# Patient Record
Sex: FEMALE | URBAN - METROPOLITAN AREA
[De-identification: names, ages, dates, MRNs, and addresses within clinical notes are randomized per-mention and may not be internally consistent; named-entity substitution may affect disease eponyms.]

---

## 2022-09-05 ENCOUNTER — INPATIENT HOSPITAL (OUTPATIENT)
Dept: URBAN - METROPOLITAN AREA HOSPITAL 107 | Facility: HOSPITAL | Age: 32
End: 2022-09-05
Payer: COMMERCIAL

## 2022-09-05 DIAGNOSIS — K92.2 GASTROINTESTINAL HEMORRHAGE, UNSPECIFIED: ICD-10-CM

## 2022-09-05 PROCEDURE — 99222 1ST HOSP IP/OBS MODERATE 55: CPT | Performed by: INTERNAL MEDICINE

## 2024-06-14 NOTE — PROGRESS NOTES
Subjective: Headaches     Patient ID: Charlotte Lynn is a 34 y.o. female.    CHIEF COMPLAINT:  Pseudotumor     History of Present Illness Ms. Lynn is a very pleasant 34-year-old  female who has a history of pseudotumor diagnosed by Dr. Sharma at [facility] years ago.  She presented today with her mother.  She is referred as a new patient by Brii FARLEY for pseudotumor.  The patient states she was diagnosed in 2019 and in 2020 she did not follow-up for any medical appointments.  She states she is now working and is established with insurance.  The patient was notified that the only treatments for pseudotumor are: Weight loss, Diamox, Topamax, lumbar puncture, shunt.  She states she cannot tolerate Topamax or Diamox.  She reports she will not have another lumbar puncture.  She was notified that she can go blind if this is not treated.  She states she has not seen a ophthalmologist for many years.  The patient stated she is interested in a shunt procedure to control the pseudotumor.    I notified the patient that I would refer her to neurosurgery and they would discuss all aspects of the shunting procedure.  I also notified the patient that I would send her to ophthalmology and that she should be followed every year at least.    Complaint:Pseudotumor  Onset: 2019  Aura: floaters  Location:  whole head or top of head, behind  quality: stabbing, throbbing  severity: 6  Duration: constant headache  Frequency: daily  Timing: anytime  Worsening factors: light  Alleviating factors:sleep  associated Signs and symptoms: photophobia,dizziness  Current meds: none  Past medications: has tried sumatriptan, Topamax, did not help.      CT UNDER MEDIA      12-8-2023      The following portions of the patient's history were reviewed and updated as appropriate: allergies, current medications, past family history, past medical history, past social history, past surgical history and problem list.      History  reviewed. No pertinent family history.    History reviewed. No pertinent past medical history.    Social History     Socioeconomic History    Marital status: Single   Tobacco Use    Smoking status: Never    Smokeless tobacco: Never   Vaping Use    Vaping status: Every Day    Substances: Nicotine    Passive vaping exposure: Yes   Substance and Sexual Activity    Alcohol use: Yes     Comment: rare    Drug use: Never    Sexual activity: Defer         Current Outpatient Medications:     metoprolol succinate XL (TOPROL-XL) 50 MG 24 hr tablet, Take 1 tablet by mouth Daily., Disp: , Rfl:     Review of Systems   HENT:  Positive for tinnitus.    Psychiatric/Behavioral:  Positive for confusion and decreased concentration. The patient is nervous/anxious.    All other systems reviewed and are negative.       I have reviewed ROS completed by medical assistant.     Objective:    Neurologic Exam     Mental Status   Oriented to person, place, and time.   Oriented to person.   Oriented to place.   Oriented to time.   Attention: normal. Concentration: normal.   Speech: speech is normal   Level of consciousness: alert  Knowledge: good and consistent with education.   Normal comprehension.     Cranial Nerves     CN II   Visual fields full to confrontation.   Visual acuity: normal  Right visual field deficit: none  Left visual field deficit: none     CN III, IV, VI   Pupils are equal, round, and reactive to light.  Extraocular motions are normal.   Right pupil: Shape: regular. Reactivity: brisk. Consensual response: intact. Accommodation: intact.   Left pupil: Shape: regular. Reactivity: brisk. Consensual response: intact. Accommodation: intact.   CN III: no CN III palsy  CN VI: no CN VI palsy  Nystagmus: none   Diplopia: none  Ophthalmoparesis: none  Upgaze: normal  Downgaze: normal  Conjugate gaze: present    CN V   Facial sensation intact.     CN VII   Facial expression full, symmetric.     CN VIII   CN VIII normal.     CN IX, X   CN  IX normal.   CN X normal.   Palate: symmetric    CN XI   CN XI normal.   Right sternocleidomastoid strength: normal  Left sternocleidomastoid strength: normal  Right trapezius strength: normal  Left trapezius strength: normal    CN XII   CN XII normal.   Tongue: not atrophic  Fasciculations: absent  Tongue deviation: none    Motor Exam   Muscle bulk: normal  Overall muscle tone: normal  Right arm tone: normal  Left arm tone: normal  Right arm pronator drift: absent  Left arm pronator drift: absent  Right leg tone: normal  Left leg tone: normal    Strength   Strength 5/5 throughout.   Right neck flexion: 5/5  Left neck flexion: 5/5  Right neck extension: 5/5  Left neck extension: 5/5  Right deltoid: 5/5  Left deltoid: 5/5  Right biceps: 5/5  Left biceps: 5/5  Right triceps: 5/5  Left triceps: 5/5  Right wrist flexion: 5/5  Left wrist flexion: 5/5  Right wrist extension: 5/5  Left wrist extension: 5/5  Right interossei: 5/5  Left interossei: 5/5  Right iliopsoas: 5/5  Left iliopsoas: 5/5  Right quadriceps: 5/5  Left quadriceps: 5/5  Right hamstrin/5  Left hamstrin/5  Right glutei: 5/5  Left glutei: 5/5  Right anterior tibial: 5/5  Left anterior tibial: 5/5  Right posterior tibial: 5/5  Left posterior tibial: 5/5  Right peroneal: 5/5  Left peroneal: 5/5  Right gastroc: 5/5  Left gastroc: 5/5    Sensory Exam   Light touch normal.   Vibration normal.   Right leg vibration: normal  Left leg vibration: normal    Gait, Coordination, and Reflexes     Gait  Gait: normal    Coordination   Romberg: negative  Finger to nose coordination: normal  Heel to shin coordination: normal  Tandem walking coordination: normal    Tremor   Resting tremor: absent  Intention tremor: absent  Action tremor: absent    Reflexes   Right brachioradialis: 1+  Left brachioradialis: 1+  Right biceps: 1+  Left biceps: 1+  Right triceps: 1+  Left triceps: 1+  Right patellar: 1+  Left patellar: 1+  Right achilles: 1+  Left achilles: 1+  Right  : 2+  Left : 2+  Right plantar: normal  Left plantar: normal  Right Noyola: absent  Left Noyola: absent  Right ankle clonus: absent  Left ankle clonus: absent  Right pendular knee jerk: absent  Left pendular knee jerk: absent      Physical Exam  Vitals reviewed.   Constitutional:       Appearance: Normal appearance. She is well-developed and well-groomed. She is obese.   HENT:      Head: Normocephalic and atraumatic.      Right Ear: Hearing normal.      Left Ear: Hearing normal.      Nose: Nose normal.      Mouth/Throat:      Lips: Pink.      Mouth: Mucous membranes are moist.   Eyes:      General: Lids are normal. No visual field deficit.     Extraocular Movements: Extraocular movements intact and EOM normal.      Right eye: Normal extraocular motion and no nystagmus.      Left eye: Normal extraocular motion and no nystagmus.      Pupils: Pupils are equal, round, and reactive to light.   Cardiovascular:      Rate and Rhythm: Normal rate.      Pulses:           Carotid pulses are 2+ on the right side and 2+ on the left side.     Heart sounds: Normal heart sounds, S1 normal and S2 normal.   Pulmonary:      Effort: Pulmonary effort is normal.      Breath sounds: Normal breath sounds.   Musculoskeletal:         General: Normal range of motion.      Cervical back: Full passive range of motion without pain and normal range of motion.      Comments: 5/5   Skin:     General: Skin is warm and dry.   Neurological:      Mental Status: She is alert and oriented to person, place, and time.      Cranial Nerves: No dysarthria or facial asymmetry.      Sensory: No sensory deficit.      Motor: Motor strength is normal.No weakness, tremor, atrophy, abnormal muscle tone, seizure activity or pronator drift.      Coordination: Romberg sign negative. Coordination normal. Finger-Nose-Finger Test, Heel to Shin Test, Romberg Test and Heel to Shin Test normal. Rapid alternating movements normal.      Gait: Gait is intact. Gait and  tandem walk normal.      Deep Tendon Reflexes: Babinski sign absent on the right side. Babinski sign absent on the left side.      Reflex Scores:       Tricep reflexes are 1+ on the right side and 1+ on the left side.       Bicep reflexes are 1+ on the right side and 1+ on the left side.       Brachioradialis reflexes are 1+ on the right side and 1+ on the left side.       Patellar reflexes are 1+ on the right side and 1+ on the left side.       Achilles reflexes are 1+ on the right side and 1+ on the left side.  Psychiatric:         Attention and Perception: Attention and perception normal.         Mood and Affect: Mood normal.         Speech: Speech normal.         Behavior: Behavior is cooperative.         Thought Content: Thought content normal.     Assessment/Plan:  Discussion: The patient has been previously diagnosed with pseudotumor by Dr. Sharma, [facility] neurologist.  I cannot locate any of his records.  The patient also states she has had trials on Diamox and Topamax and could not tolerate each.  She also states she cannot take tolerate any more lumbar punctures.  She also has not been seen by ophthalmology.  I notified the patient that I would refer her to ophthalmology.  She asked for referral to neurosurgery to discuss possible shunting.  Both the patient and her mother were in agreement with these referrals.    Diagnoses and all orders for this visit:    1. Pseudotumor cerebri (Primary)  -     Ambulatory Referral to Ophthalmology  -     Ambulatory Referral to Neurosurgery        Return if symptoms worsen or fail to improve.    I spent 46 minutes caring for Charlotte on this date of service. This time includes time spent by me in the following activities: preparing for the visit, reviewing tests, obtaining and/or reviewing a separately obtained history, performing a medically appropriate examination and/or evaluation, counseling and educating the patient/family/caregiver, referring and communicating  with other health care professionals, documenting information in the medical record, and independently interpreting results and communicating that information with the patient/family/caregiver.      This document has been electronically signed by Meg VERAS on June 17, 2024 15:33 EDT

## 2024-06-17 ENCOUNTER — OFFICE VISIT (OUTPATIENT)
Dept: NEUROLOGY | Facility: CLINIC | Age: 34
End: 2024-06-17
Payer: MEDICAID

## 2024-06-17 VITALS
DIASTOLIC BLOOD PRESSURE: 79 MMHG | SYSTOLIC BLOOD PRESSURE: 118 MMHG | HEIGHT: 64 IN | HEART RATE: 78 BPM | BODY MASS INDEX: 33.29 KG/M2 | WEIGHT: 195 LBS

## 2024-06-17 DIAGNOSIS — G93.2 PSEUDOTUMOR CEREBRI: Primary | ICD-10-CM

## 2024-06-17 PROCEDURE — 1159F MED LIST DOCD IN RCRD: CPT | Performed by: NURSE PRACTITIONER

## 2024-06-17 PROCEDURE — 99204 OFFICE O/P NEW MOD 45 MIN: CPT | Performed by: NURSE PRACTITIONER

## 2024-06-17 PROCEDURE — 1160F RVW MEDS BY RX/DR IN RCRD: CPT | Performed by: NURSE PRACTITIONER

## 2024-06-17 RX ORDER — METOPROLOL SUCCINATE 50 MG/1
50 TABLET, EXTENDED RELEASE ORAL DAILY
COMMUNITY

## 2024-06-17 RX ORDER — ESCITALOPRAM OXALATE 20 MG/1
20 TABLET ORAL
COMMUNITY
Start: 2024-01-05 | End: 2024-06-17

## 2024-06-19 ENCOUNTER — TELEPHONE (OUTPATIENT)
Dept: NEUROLOGY | Facility: CLINIC | Age: 34
End: 2024-06-19
Payer: MEDICAID

## 2024-06-19 DIAGNOSIS — G93.2 PSEUDOTUMOR CEREBRI: Primary | ICD-10-CM

## 2024-06-20 ENCOUNTER — PATIENT ROUNDING (BHMG ONLY) (OUTPATIENT)
Dept: NEUROLOGY | Facility: CLINIC | Age: 34
End: 2024-06-20
Payer: MEDICAID

## 2024-06-20 NOTE — PROGRESS NOTES
.p  My name is Jennifer Kamara and I am the practice manager for [facility].    I want to officially welcome you to our practice and ask you about your recent visit.     If you could tell me about your recent visit with us. What things went well?    We're always looking for ways to make our patients' experiences even better. Do you have any recommendations on ways we may improve?    Overall were you satisfied with your first visit to our practice?    I appreciate you taking the time to answer a few questions today.    Thank you and have a great day.    Jennifer

## 2024-07-12 ENCOUNTER — HOSPITAL ENCOUNTER (OUTPATIENT)
Dept: MRI IMAGING | Facility: HOSPITAL | Age: 34
Discharge: HOME | End: 2024-07-12
Payer: MEDICAID

## 2024-07-12 DIAGNOSIS — G93.2 PSEUDOTUMOR CEREBRI: ICD-10-CM

## 2024-07-12 PROCEDURE — 25010000002 GADOTERIDOL PER 1 ML: Performed by: NURSE PRACTITIONER

## 2024-07-12 PROCEDURE — 70553 MRI BRAIN STEM W/O & W/DYE: CPT

## 2024-07-12 PROCEDURE — A9579 GAD-BASE MR CONTRAST NOS,1ML: HCPCS | Performed by: NURSE PRACTITIONER

## 2024-07-12 RX ADMIN — GADOTERIDOL 20 ML: 279.3 INJECTION, SOLUTION INTRAVENOUS at 15:44

## 2024-07-15 NOTE — PROGRESS NOTES
Please call the patient regarding her abnormal result.  Notify the patient that the MRI was compared to the CT from December 5, 2023 and the other MRI from 2023 in June.  They did not discuss any pseudotumor appearances, they did say that she had a small mucous retention cyst in both maxillary sinuses.  Tell her that most people in the [facility] have mucous cyst somewhere in her sinuses and that she should follow-up with her PCP.  If she has not seen yet she does need to see ophthalmology.            MRI BRAIN W WO CONTRAST  Date of Exam: 7/12/2024 3:16 PM EDT  Indication: Pseudotumor Cerebri.  Comparison: CT head from December 5, 2023 and MRI brain from June 22, 2023   Technique:  Routine multiplanar/multisequence sequence images of the brain were obtained before and after the uneventful administration of Prohance.  Findings:  No acute infarction, intracranial hemorrhage, or extra-axial collection is identified. The ventricles appear normal in caliber, with no evidence of mass effect or midline shift. The basal cisterns appear patent. No pathological parenchymal enhancement or  mass is identified.  The midline structures appear intact. The globes and orbits appear intact. The intracranial vascular flow-voids appear patent. Prominent perivascular spaces within the right centrum semiovale appear unchanged. There are small mucous retention cysts   within the bilateral maxillary sinuses.   IMPRESSION:  Impression:  1.No acute intracranial process identified.  2.Small mucous retention cysts within bilateral maxillary sinuses.   Electronically Signed: Jesus Jaime MD    7/12/2024 5:53 PM EDT    Workstation ID: [id number]

## 2024-07-30 ENCOUNTER — TELEPHONE (OUTPATIENT)
Dept: NEUROLOGY | Facility: CLINIC | Age: 34
End: 2024-07-30
Payer: MEDICAID

## 2024-07-30 NOTE — TELEPHONE ENCOUNTER
Called and informed patient that [facility] was the closest Neurosurgery office at this time. She voiced understanding.

## 2024-07-30 NOTE — TELEPHONE ENCOUNTER
Provider: ANGIE    Caller: BONY    Phone Number: [phone]     Reason for Call: PT CALLED AND IS WANTING TO KNOW IF THERE IS A NEUROSURGEON THAN SHE CAN SEE IN [place] AS PT DOES NOT HAVE TRANSPORTATION TO [place] AT THIS TIME.    THANK YOU

## 2024-08-01 ENCOUNTER — TELEPHONE (OUTPATIENT)
Dept: NEUROSURGERY | Facility: CLINIC | Age: 34
End: 2024-08-01

## 2024-08-01 NOTE — TELEPHONE ENCOUNTER
PATIENT CALLED IN TO GET TODAY'S APPOINTMENT RESCHEDULED; ATTEMPTED WT AND NOT SUCCESSFUL    PLEASE CALL PATIENT TO RESCHEDULE     [phone]    THANK YOU!

## 2024-08-06 NOTE — PROGRESS NOTES
Subjective   Patient ID: Charlotte Lynn is a 34 y.o. female is being seen for consultation today at the request of Meg Oakes, * for - Pseudotumor cerebri.MRI brain was completed on 07-.    History of Present Illness  34-year-old  female who has a history of pseudotumor diagnosed by Dr. Sharma at [facility] years ago.  She presented today with her mother.  The patient states she was diagnosed in 2019 and in 2020 and she did not follow-up for any medical appointments secondary to insurance issues.  She states she is now working and is established with insurance.  The patient was notified that the only treatments for pseudotumor are: Weight loss, Diamox, Topamax, lumbar puncture, or shunt.  She states she cannot tolerate Topamax or Diamox.  She reports she will not have another lumbar puncture.  She was notified that she can go blind if this is not treated.  She states she has not seen a ophthalmologist for many years, but is scheduled to see one soon.  The patient stated she is interested in a shunt procedure to control the pseudotumor, but presents today to discuss the possibility of a dural sinus stent should she have dural sinus stenosis causing her IIH.    The following portions of the patient's history were reviewed and updated as appropriate: She  has no past medical history on file.  She does not have any pertinent problems on file.  She  has a past surgical history that includes Other surgical history.  Her family history is not on file.  She  reports that she has never smoked. She has never used smokeless tobacco. She reports current alcohol use. She reports that she does not use drugs.  Current Outpatient Medications   Medication Sig Dispense Refill   • metoprolol succinate XL (TOPROL-XL) 50 MG 24 hr tablet Take 1 tablet by mouth Daily.       No current facility-administered medications for this visit.     Current Outpatient Medications on File Prior to Visit   Medication Sig   •  "metoprolol succinate XL (TOPROL-XL) 50 MG 24 hr tablet Take 1 tablet by mouth Daily.     No current facility-administered medications on file prior to visit.     She has No Known Allergies..    Review of Systems   Neurological:  Positive for dizziness. Negative for facial asymmetry, light-headedness, numbness and headaches.   All other systems reviewed and are negative.    Objective     Vitals:    24 1042   BP: 132/80   Pulse: 74   Temp: 97.3 °F (36.3 °C)   SpO2: 100%   Weight: 88.5 kg (195 lb)   Height: 162 cm (63.78\")     Body mass index is 33.7 kg/m².      Physical Exam  Vitals and nursing note reviewed.   Constitutional:       General: She is in acute distress.      Appearance: She is obese.   Eyes:      Extraocular Movements: Extraocular movements intact.   Cardiovascular:      Rate and Rhythm: Normal rate.   Pulmonary:      Effort: Pulmonary effort is normal.   Musculoskeletal:         General: Normal range of motion.      Cervical back: Normal range of motion.   Skin:     General: Skin is warm and dry.   Neurological:      General: No focal deficit present.      Mental Status: She is alert and oriented to person, place, and time.      Cranial Nerves: No cranial nerve deficit.      Sensory: No sensory deficit.      Motor: No weakness.      Coordination: Coordination normal.      Gait: Gait normal.   Neurologic Exam     Mental Status   Oriented to person, place, and time.     Assessment & Plan   Independent Review of Radiographic Studies:      I personally reviewed the images from the following studies.    The MRI of the brain performed on 2024 was reviewed with the patient showing no intracranial abnormality, but identification of some maxillary sinus disease.    Medical Decision Makin-year-old female with history of pseudotumor cerebri with intolerance for Diamox having dizziness, photophobia and tinnitus.  3 lumbar punctures have been performed with elevated opening pressures.  Patient " is getting ready to have an additional LP in the near future.  No imaging of the dural sinuses has been performed and a CT venogram will be ordered to assess for any dural sinus stenosis for the resulting cause of the IIH.  Depending on the results of the CTV, the patient may be a candidate for dural sinus stenting.  Diagnoses and all orders for this visit:    1. Idiopathic intracranial hypertension (Primary)    2. Chronic intractable headache, unspecified headache type      Return in about 1 week (around 8/27/2024) for Recheck CT venogram.

## 2024-08-20 ENCOUNTER — OFFICE VISIT (OUTPATIENT)
Dept: NEUROSURGERY | Facility: CLINIC | Age: 34
End: 2024-08-20
Payer: MEDICAID

## 2024-08-20 VITALS
SYSTOLIC BLOOD PRESSURE: 132 MMHG | HEIGHT: 64 IN | DIASTOLIC BLOOD PRESSURE: 80 MMHG | TEMPERATURE: 97.3 F | WEIGHT: 195 LBS | OXYGEN SATURATION: 100 % | BODY MASS INDEX: 33.29 KG/M2 | HEART RATE: 74 BPM

## 2024-08-20 DIAGNOSIS — G89.29 CHRONIC INTRACTABLE HEADACHE, UNSPECIFIED HEADACHE TYPE: ICD-10-CM

## 2024-08-20 DIAGNOSIS — G93.2 IDIOPATHIC INTRACRANIAL HYPERTENSION: Primary | ICD-10-CM

## 2024-08-20 DIAGNOSIS — R51.9 CHRONIC INTRACTABLE HEADACHE, UNSPECIFIED HEADACHE TYPE: ICD-10-CM

## 2024-08-20 PROCEDURE — 1159F MED LIST DOCD IN RCRD: CPT | Performed by: RADIOLOGY

## 2024-08-20 PROCEDURE — 1160F RVW MEDS BY RX/DR IN RCRD: CPT | Performed by: RADIOLOGY

## 2024-08-20 PROCEDURE — 99203 OFFICE O/P NEW LOW 30 MIN: CPT | Performed by: RADIOLOGY

## 2024-08-21 PROBLEM — R51.9 CHRONIC HEADACHES: Status: ACTIVE | Noted: 2024-08-21

## 2024-08-21 PROBLEM — G89.29 CHRONIC HEADACHES: Status: ACTIVE | Noted: 2024-08-21

## 2024-08-21 PROBLEM — G93.2 IDIOPATHIC INTRACRANIAL HYPERTENSION: Status: ACTIVE | Noted: 2024-08-21

## 2024-08-23 ENCOUNTER — HOSPITAL ENCOUNTER (OUTPATIENT)
Dept: INTERVENTIONAL RADIOLOGY/VASCULAR | Facility: HOSPITAL | Age: 34
Discharge: HOME | End: 2024-08-23
Payer: MEDICAID

## 2024-08-23 VITALS
DIASTOLIC BLOOD PRESSURE: 73 MMHG | WEIGHT: 195 LBS | BODY MASS INDEX: 34.55 KG/M2 | HEART RATE: 58 BPM | TEMPERATURE: 98.3 F | RESPIRATION RATE: 16 BRPM | SYSTOLIC BLOOD PRESSURE: 111 MMHG | OXYGEN SATURATION: 99 % | HEIGHT: 63 IN

## 2024-08-23 DIAGNOSIS — G93.2 PSEUDOTUMOR CEREBRI: ICD-10-CM

## 2024-08-23 LAB
APPEARANCE CSF: CLEAR
APTT PPP: 30.6 SECONDS (ref 24–31)
B-HCG UR QL: NEGATIVE
BASOPHILS # BLD AUTO: 0.04 10*3/MM3 (ref 0–0.2)
BASOPHILS NFR BLD AUTO: 0.7 % (ref 0–1.5)
COLOR CSF: COLORLESS
COLOR SPUN CSF: COLORLESS
DEPRECATED RDW RBC AUTO: 37.4 FL (ref 37–54)
EOSINOPHIL # BLD AUTO: 0.1 10*3/MM3 (ref 0–0.4)
EOSINOPHIL NFR BLD AUTO: 1.7 % (ref 0.3–6.2)
ERYTHROCYTE [DISTWIDTH] IN BLOOD BY AUTOMATED COUNT: 12.2 % (ref 12.3–15.4)
GLUCOSE CSF-MCNC: 47 MG/DL (ref 40–70)
HCT VFR BLD AUTO: 42.1 % (ref 34–46.6)
HGB BLD-MCNC: 14.3 G/DL (ref 12–15.9)
HOLD SPECIMEN: NORMAL
IMM GRANULOCYTES # BLD AUTO: 0.01 10*3/MM3 (ref 0–0.05)
IMM GRANULOCYTES NFR BLD AUTO: 0.2 % (ref 0–0.5)
INR PPP: 1 (ref 0.93–1.1)
LYMPHOCYTES # BLD AUTO: 1.85 10*3/MM3 (ref 0.7–3.1)
LYMPHOCYTES NFR BLD AUTO: 31.1 % (ref 19.6–45.3)
MCH RBC QN AUTO: 28.8 PG (ref 26.6–33)
MCHC RBC AUTO-ENTMCNC: 34 G/DL (ref 31.5–35.7)
MCV RBC AUTO: 84.7 FL (ref 79–97)
METHOD: NORMAL
MONOCYTES # BLD AUTO: 0.43 10*3/MM3 (ref 0.1–0.9)
MONOCYTES NFR BLD AUTO: 7.2 % (ref 5–12)
NEUTROPHILS NFR BLD AUTO: 3.51 10*3/MM3 (ref 1.7–7)
NEUTROPHILS NFR BLD AUTO: 59.1 % (ref 42.7–76)
NRBC BLD AUTO-RTO: 0 /100 WBC (ref 0–0.2)
NUC CELL # CSF MANUAL: 2 /MM3 (ref 0–5)
PLATELET # BLD AUTO: 236 10*3/MM3 (ref 140–450)
PMV BLD AUTO: 10 FL (ref 6–12)
PROT CSF-MCNC: 23.1 MG/DL (ref 15–45)
PROTHROMBIN TIME: 10.9 SECONDS (ref 9.6–11.7)
RBC # BLD AUTO: 4.97 10*6/MM3 (ref 3.77–5.28)
RBC # CSF MANUAL: 0 /MM3 (ref 0–5)
SPECIMEN VOL CSF: 2 ML
TUBE # CSF: 3
WBC NRBC COR # BLD AUTO: 5.94 10*3/MM3 (ref 3.4–10.8)

## 2024-08-23 PROCEDURE — 87205 SMEAR GRAM STAIN: CPT | Performed by: OPHTHALMOLOGY

## 2024-08-23 PROCEDURE — 82945 GLUCOSE OTHER FLUID: CPT | Performed by: OPHTHALMOLOGY

## 2024-08-23 PROCEDURE — 25010000002 LIDOCAINE 1 % SOLUTION

## 2024-08-23 PROCEDURE — 85730 THROMBOPLASTIN TIME PARTIAL: CPT | Performed by: RADIOLOGY

## 2024-08-23 PROCEDURE — 89050 BODY FLUID CELL COUNT: CPT | Performed by: OPHTHALMOLOGY

## 2024-08-23 PROCEDURE — 84157 ASSAY OF PROTEIN OTHER: CPT | Performed by: OPHTHALMOLOGY

## 2024-08-23 PROCEDURE — 81025 URINE PREGNANCY TEST: CPT | Performed by: RADIOLOGY

## 2024-08-23 PROCEDURE — 85025 COMPLETE CBC W/AUTO DIFF WBC: CPT | Performed by: RADIOLOGY

## 2024-08-23 PROCEDURE — 87015 SPECIMEN INFECT AGNT CONCNTJ: CPT | Performed by: OPHTHALMOLOGY

## 2024-08-23 PROCEDURE — 85610 PROTHROMBIN TIME: CPT | Performed by: RADIOLOGY

## 2024-08-23 PROCEDURE — 87070 CULTURE OTHR SPECIMN AEROBIC: CPT | Performed by: OPHTHALMOLOGY

## 2024-08-23 RX ORDER — LIDOCAINE HYDROCHLORIDE 10 MG/ML
INJECTION, SOLUTION INFILTRATION; PERINEURAL AS NEEDED
Status: DISCONTINUED | OUTPATIENT
Start: 2024-08-23 | End: 2024-08-24 | Stop reason: HOSPADM

## 2024-08-23 RX ORDER — SODIUM CHLORIDE 0.9 % (FLUSH) 0.9 %
10 SYRINGE (ML) INJECTION EVERY 12 HOURS SCHEDULED
Status: DISCONTINUED | OUTPATIENT
Start: 2024-08-23 | End: 2024-08-24 | Stop reason: HOSPADM

## 2024-08-23 RX ORDER — SODIUM CHLORIDE 0.9 % (FLUSH) 0.9 %
10 SYRINGE (ML) INJECTION AS NEEDED
Status: DISCONTINUED | OUTPATIENT
Start: 2024-08-23 | End: 2024-08-24 | Stop reason: HOSPADM

## 2024-08-23 RX ADMIN — Medication 10 ML: at 11:25

## 2024-08-23 RX ADMIN — LIDOCAINE HYDROCHLORIDE 5 ML: 10 INJECTION, SOLUTION INFILTRATION; PERINEURAL at 13:27

## 2024-08-23 NOTE — DISCHARGE INSTRUCTIONS
You may resume all your regular medications.  Follow up with Dr. Keita for results.  Lie flat and rest as much as possible for the next 24 hours.  Increase your fluid intake over the next 24 hours.  If you start to get a headache, drink something with caffeine in it.  Do not lift anything heavier than 10 lbs for 48 hours.  Do not drive for the remainder of the day. If you experience a headache not relieved with medications or rest, numbness in your legs, a racing heartbeat or increased shortness of air, seek immediate medical attention.

## 2024-08-26 LAB
BACTERIA SPEC AEROBE CULT: NORMAL
GRAM STN SPEC: NORMAL
GRAM STN SPEC: NORMAL

## 2024-10-02 ENCOUNTER — HOSPITAL ENCOUNTER (OUTPATIENT)
Dept: CT IMAGING | Facility: HOSPITAL | Age: 34
Discharge: HOME | End: 2024-10-02
Admitting: RADIOLOGY
Payer: MEDICAID

## 2024-10-02 DIAGNOSIS — G93.2 IDIOPATHIC INTRACRANIAL HYPERTENSION: ICD-10-CM

## 2024-10-02 DIAGNOSIS — R51.9 CHRONIC INTRACTABLE HEADACHE, UNSPECIFIED HEADACHE TYPE: ICD-10-CM

## 2024-10-02 DIAGNOSIS — G89.29 CHRONIC INTRACTABLE HEADACHE, UNSPECIFIED HEADACHE TYPE: ICD-10-CM

## 2024-10-02 PROCEDURE — 25510000001 IOPAMIDOL PER 1 ML: Performed by: RADIOLOGY

## 2024-10-02 PROCEDURE — 70496 CT ANGIOGRAPHY HEAD: CPT

## 2024-10-02 RX ORDER — IOPAMIDOL 755 MG/ML
100 INJECTION, SOLUTION INTRAVASCULAR
Status: COMPLETED | OUTPATIENT
Start: 2024-10-02 | End: 2024-10-02

## 2024-10-02 RX ADMIN — IOPAMIDOL 100 ML: 755 INJECTION, SOLUTION INTRAVENOUS at 14:02

## 2024-10-24 ENCOUNTER — HOSPITAL ENCOUNTER (OUTPATIENT)
Facility: HOSPITAL | Age: 34
Setting detail: OBSERVATION
Discharge: HOME | End: 2024-10-25
Attending: EMERGENCY MEDICINE | Admitting: STUDENT IN AN ORGANIZED HEALTH CARE EDUCATION/TRAINING PROGRAM
Payer: MEDICAID

## 2024-10-24 ENCOUNTER — APPOINTMENT (OUTPATIENT)
Dept: CARDIOLOGY | Facility: HOSPITAL | Age: 34
End: 2024-10-24
Payer: MEDICAID

## 2024-10-24 ENCOUNTER — APPOINTMENT (OUTPATIENT)
Dept: MRI IMAGING | Facility: HOSPITAL | Age: 34
End: 2024-10-24
Payer: MEDICAID

## 2024-10-24 ENCOUNTER — APPOINTMENT (OUTPATIENT)
Dept: NEUROLOGY | Facility: HOSPITAL | Age: 34
End: 2024-10-24
Payer: MEDICAID

## 2024-10-24 DIAGNOSIS — R56.9 SEIZURE: Primary | ICD-10-CM

## 2024-10-24 LAB
AMPHET+METHAMPHET UR QL: NEGATIVE
AMPHETAMINES UR QL: NEGATIVE
ANION GAP SERPL CALCULATED.3IONS-SCNC: 10.3 MMOL/L (ref 5–15)
B-HCG UR QL: NEGATIVE
BARBITURATES UR QL SCN: NEGATIVE
BASOPHILS # BLD AUTO: 0.05 10*3/MM3 (ref 0–0.2)
BASOPHILS NFR BLD AUTO: 0.5 % (ref 0–1.5)
BENZODIAZ UR QL SCN: NEGATIVE
BH CV ECHO MEAS - ACS: 2 CM
BH CV ECHO MEAS - AO MAX PG: 6.6 MMHG
BH CV ECHO MEAS - AO MEAN PG: 4 MMHG
BH CV ECHO MEAS - AO ROOT DIAM: 2.9 CM
BH CV ECHO MEAS - AO V2 MAX: 128 CM/SEC
BH CV ECHO MEAS - AO V2 VTI: 27.8 CM
BH CV ECHO MEAS - AVA(I,D): 1.76 CM2
BH CV ECHO MEAS - EDV(CUBED): 103.8 ML
BH CV ECHO MEAS - EDV(MOD-SP4): 59.2 ML
BH CV ECHO MEAS - EF(MOD-BP): 56 %
BH CV ECHO MEAS - EF(MOD-SP4): 56.1 %
BH CV ECHO MEAS - ESV(CUBED): 27 ML
BH CV ECHO MEAS - ESV(MOD-SP4): 26 ML
BH CV ECHO MEAS - FS: 36.2 %
BH CV ECHO MEAS - IVS/LVPW: 1.13 CM
BH CV ECHO MEAS - IVSD: 0.9 CM
BH CV ECHO MEAS - LA DIMENSION: 3.9 CM
BH CV ECHO MEAS - LAT PEAK E' VEL: 14.3 CM/SEC
BH CV ECHO MEAS - LV DIASTOLIC VOL/BSA (35-75): 30.8 CM2
BH CV ECHO MEAS - LV MASS(C)D: 132.3 GRAMS
BH CV ECHO MEAS - LV MAX PG: 1.38 MMHG
BH CV ECHO MEAS - LV MEAN PG: 1 MMHG
BH CV ECHO MEAS - LV SYSTOLIC VOL/BSA (12-30): 13.5 CM2
BH CV ECHO MEAS - LV V1 MAX: 58.7 CM/SEC
BH CV ECHO MEAS - LV V1 VTI: 12.9 CM
BH CV ECHO MEAS - LVIDD: 4.7 CM
BH CV ECHO MEAS - LVIDS: 3 CM
BH CV ECHO MEAS - LVOT AREA: 3.8 CM2
BH CV ECHO MEAS - LVOT DIAM: 2.2 CM
BH CV ECHO MEAS - LVPWD: 0.8 CM
BH CV ECHO MEAS - MED PEAK E' VEL: 9.8 CM/SEC
BH CV ECHO MEAS - MR MAX PG: 70.2 MMHG
BH CV ECHO MEAS - MR MAX VEL: 419 CM/SEC
BH CV ECHO MEAS - MV A MAX VEL: 36.2 CM/SEC
BH CV ECHO MEAS - MV DEC SLOPE: 171 CM/SEC2
BH CV ECHO MEAS - MV DEC TIME: 0.21 SEC
BH CV ECHO MEAS - MV E MAX VEL: 67.1 CM/SEC
BH CV ECHO MEAS - MV E/A: 1.85
BH CV ECHO MEAS - MV MAX PG: 2.7 MMHG
BH CV ECHO MEAS - MV MEAN PG: 1 MMHG
BH CV ECHO MEAS - MV P1/2T: 144 MSEC
BH CV ECHO MEAS - MV V2 VTI: 31.6 CM
BH CV ECHO MEAS - MVA(P1/2T): 1.53 CM2
BH CV ECHO MEAS - MVA(VTI): 1.55 CM2
BH CV ECHO MEAS - PA V2 MAX: 89.8 CM/SEC
BH CV ECHO MEAS - PI END-D VEL: 75.6 CM/SEC
BH CV ECHO MEAS - PULM A REVS DUR: 0.12 SEC
BH CV ECHO MEAS - PULM A REVS VEL: 22.7 CM/SEC
BH CV ECHO MEAS - PULM DIAS VEL: 47.4 CM/SEC
BH CV ECHO MEAS - PULM S/D: 1.35
BH CV ECHO MEAS - PULM SYS VEL: 63.9 CM/SEC
BH CV ECHO MEAS - RV MAX PG: 0.8 MMHG
BH CV ECHO MEAS - RV V1 MAX: 44.7 CM/SEC
BH CV ECHO MEAS - RV V1 VTI: 10.7 CM
BH CV ECHO MEAS - SV(LVOT): 49 ML
BH CV ECHO MEAS - SV(MOD-SP4): 33.2 ML
BH CV ECHO MEAS - SVI(LVOT): 25.5 ML/M2
BH CV ECHO MEAS - SVI(MOD-SP4): 17.3 ML/M2
BH CV ECHO MEAS - TAPSE (>1.6): 1.7 CM
BH CV ECHO MEAS - TR MAX PG: 18.3 MMHG
BH CV ECHO MEAS - TR MAX VEL: 214 CM/SEC
BH CV ECHO MEASUREMENTS AVERAGE E/E' RATIO: 5.57
BH CV XLRA - TDI S': 8.5 CM/SEC
BUN SERPL-MCNC: 11 MG/DL (ref 6–20)
BUN/CREAT SERPL: 16.7 (ref 7–25)
BUPRENORPHINE SERPL-MCNC: NEGATIVE NG/ML
CALCIUM SPEC-SCNC: 9.2 MG/DL (ref 8.6–10.5)
CANNABINOIDS SERPL QL: NEGATIVE
CHLORIDE SERPL-SCNC: 105 MMOL/L (ref 98–107)
CO2 SERPL-SCNC: 23.7 MMOL/L (ref 22–29)
COCAINE UR QL: NEGATIVE
CREAT SERPL-MCNC: 0.66 MG/DL (ref 0.57–1)
DEPRECATED RDW RBC AUTO: 37.5 FL (ref 37–54)
EGFRCR SERPLBLD CKD-EPI 2021: 119.7 ML/MIN/1.73
EOSINOPHIL # BLD AUTO: 0.16 10*3/MM3 (ref 0–0.4)
EOSINOPHIL NFR BLD AUTO: 1.7 % (ref 0.3–6.2)
ERYTHROCYTE [DISTWIDTH] IN BLOOD BY AUTOMATED COUNT: 12.3 % (ref 12.3–15.4)
GLUCOSE SERPL-MCNC: 105 MG/DL (ref 65–99)
HCT VFR BLD AUTO: 38.2 % (ref 34–46.6)
HGB BLD-MCNC: 13 G/DL (ref 12–15.9)
IMM GRANULOCYTES # BLD AUTO: 0.03 10*3/MM3 (ref 0–0.05)
IMM GRANULOCYTES NFR BLD AUTO: 0.3 % (ref 0–0.5)
LEFT ATRIUM VOLUME INDEX: 20.9 ML/M2
LYMPHOCYTES # BLD AUTO: 1.83 10*3/MM3 (ref 0.7–3.1)
LYMPHOCYTES NFR BLD AUTO: 19.7 % (ref 19.6–45.3)
MAGNESIUM SERPL-MCNC: 2.1 MG/DL (ref 1.6–2.6)
MCH RBC QN AUTO: 28.4 PG (ref 26.6–33)
MCHC RBC AUTO-ENTMCNC: 34 G/DL (ref 31.5–35.7)
MCV RBC AUTO: 83.6 FL (ref 79–97)
METHADONE UR QL SCN: NEGATIVE
MONOCYTES # BLD AUTO: 0.59 10*3/MM3 (ref 0.1–0.9)
MONOCYTES NFR BLD AUTO: 6.4 % (ref 5–12)
NEUTROPHILS NFR BLD AUTO: 6.62 10*3/MM3 (ref 1.7–7)
NEUTROPHILS NFR BLD AUTO: 71.4 % (ref 42.7–76)
NRBC BLD AUTO-RTO: 0 /100 WBC (ref 0–0.2)
OPIATES UR QL: NEGATIVE
OXYCODONE UR QL SCN: NEGATIVE
PCP UR QL SCN: NEGATIVE
PHOSPHATE SERPL-MCNC: 2.5 MG/DL (ref 2.5–4.5)
PLATELET # BLD AUTO: 207 10*3/MM3 (ref 140–450)
PMV BLD AUTO: 9.8 FL (ref 6–12)
POTASSIUM SERPL-SCNC: 3.9 MMOL/L (ref 3.5–5.2)
RBC # BLD AUTO: 4.57 10*6/MM3 (ref 3.77–5.28)
SINUS: 2.9 CM
SODIUM SERPL-SCNC: 139 MMOL/L (ref 136–145)
STJ: 2.5 CM
TRICYCLICS UR QL SCN: NEGATIVE
TSH SERPL DL<=0.05 MIU/L-ACNC: 1.13 UIU/ML (ref 0.27–4.2)
WBC NRBC COR # BLD AUTO: 9.28 10*3/MM3 (ref 3.4–10.8)

## 2024-10-24 PROCEDURE — 80048 BASIC METABOLIC PNL TOTAL CA: CPT | Performed by: EMERGENCY MEDICINE

## 2024-10-24 PROCEDURE — 95819 EEG AWAKE AND ASLEEP: CPT | Performed by: PSYCHIATRY & NEUROLOGY

## 2024-10-24 PROCEDURE — 25010000002 ENOXAPARIN PER 10 MG: Performed by: STUDENT IN AN ORGANIZED HEALTH CARE EDUCATION/TRAINING PROGRAM

## 2024-10-24 PROCEDURE — 70551 MRI BRAIN STEM W/O DYE: CPT

## 2024-10-24 PROCEDURE — G0378 HOSPITAL OBSERVATION PER HR: HCPCS

## 2024-10-24 PROCEDURE — 93005 ELECTROCARDIOGRAM TRACING: CPT | Performed by: STUDENT IN AN ORGANIZED HEALTH CARE EDUCATION/TRAINING PROGRAM

## 2024-10-24 PROCEDURE — 85025 COMPLETE CBC W/AUTO DIFF WBC: CPT | Performed by: EMERGENCY MEDICINE

## 2024-10-24 PROCEDURE — 93306 TTE W/DOPPLER COMPLETE: CPT

## 2024-10-24 PROCEDURE — 95819 EEG AWAKE AND ASLEEP: CPT

## 2024-10-24 PROCEDURE — 96372 THER/PROPH/DIAG INJ SC/IM: CPT

## 2024-10-24 PROCEDURE — 93010 ELECTROCARDIOGRAM REPORT: CPT | Performed by: INTERNAL MEDICINE

## 2024-10-24 PROCEDURE — 70544 MR ANGIOGRAPHY HEAD W/O DYE: CPT

## 2024-10-24 PROCEDURE — 81025 URINE PREGNANCY TEST: CPT | Performed by: EMERGENCY MEDICINE

## 2024-10-24 PROCEDURE — 99214 OFFICE O/P EST MOD 30 MIN: CPT | Performed by: NURSE PRACTITIONER

## 2024-10-24 PROCEDURE — 83735 ASSAY OF MAGNESIUM: CPT | Performed by: NURSE PRACTITIONER

## 2024-10-24 PROCEDURE — 93306 TTE W/DOPPLER COMPLETE: CPT | Performed by: INTERNAL MEDICINE

## 2024-10-24 PROCEDURE — 84100 ASSAY OF PHOSPHORUS: CPT | Performed by: NURSE PRACTITIONER

## 2024-10-24 PROCEDURE — 80306 DRUG TEST PRSMV INSTRMNT: CPT | Performed by: EMERGENCY MEDICINE

## 2024-10-24 PROCEDURE — 84443 ASSAY THYROID STIM HORMONE: CPT | Performed by: NURSE PRACTITIONER

## 2024-10-24 PROCEDURE — 99285 EMERGENCY DEPT VISIT HI MDM: CPT

## 2024-10-24 PROCEDURE — 99204 OFFICE O/P NEW MOD 45 MIN: CPT | Performed by: INTERNAL MEDICINE

## 2024-10-24 RX ORDER — BISACODYL 10 MG
10 SUPPOSITORY, RECTAL RECTAL DAILY PRN
Status: DISCONTINUED | OUTPATIENT
Start: 2024-10-24 | End: 2024-10-25 | Stop reason: HOSPADM

## 2024-10-24 RX ORDER — SODIUM CHLORIDE 0.9 % (FLUSH) 0.9 %
10 SYRINGE (ML) INJECTION EVERY 12 HOURS SCHEDULED
Status: DISCONTINUED | OUTPATIENT
Start: 2024-10-24 | End: 2024-10-25 | Stop reason: HOSPADM

## 2024-10-24 RX ORDER — ENOXAPARIN SODIUM 100 MG/ML
40 INJECTION SUBCUTANEOUS EVERY 24 HOURS
Status: DISCONTINUED | OUTPATIENT
Start: 2024-10-24 | End: 2024-10-25 | Stop reason: HOSPADM

## 2024-10-24 RX ORDER — LEVETIRACETAM 500 MG/1
500 TABLET ORAL 2 TIMES DAILY
Status: DISCONTINUED | OUTPATIENT
Start: 2024-10-24 | End: 2024-10-25 | Stop reason: HOSPADM

## 2024-10-24 RX ORDER — BISACODYL 5 MG/1
5 TABLET, DELAYED RELEASE ORAL DAILY PRN
Status: DISCONTINUED | OUTPATIENT
Start: 2024-10-24 | End: 2024-10-25 | Stop reason: HOSPADM

## 2024-10-24 RX ORDER — SODIUM CHLORIDE 0.9 % (FLUSH) 0.9 %
10 SYRINGE (ML) INJECTION AS NEEDED
Status: DISCONTINUED | OUTPATIENT
Start: 2024-10-24 | End: 2024-10-25 | Stop reason: HOSPADM

## 2024-10-24 RX ORDER — AMOXICILLIN 250 MG
2 CAPSULE ORAL 2 TIMES DAILY PRN
Status: DISCONTINUED | OUTPATIENT
Start: 2024-10-24 | End: 2024-10-25 | Stop reason: HOSPADM

## 2024-10-24 RX ORDER — NITROGLYCERIN 0.4 MG/1
0.4 TABLET SUBLINGUAL
Status: DISCONTINUED | OUTPATIENT
Start: 2024-10-24 | End: 2024-10-25 | Stop reason: HOSPADM

## 2024-10-24 RX ORDER — LORAZEPAM 2 MG/ML
1 INJECTION INTRAMUSCULAR EVERY 4 HOURS PRN
Status: DISCONTINUED | OUTPATIENT
Start: 2024-10-24 | End: 2024-10-25 | Stop reason: HOSPADM

## 2024-10-24 RX ORDER — POLYETHYLENE GLYCOL 3350 17 G/17G
17 POWDER, FOR SOLUTION ORAL DAILY PRN
Status: DISCONTINUED | OUTPATIENT
Start: 2024-10-24 | End: 2024-10-25 | Stop reason: HOSPADM

## 2024-10-24 RX ADMIN — LEVETIRACETAM 500 MG: 500 TABLET, FILM COATED ORAL at 13:30

## 2024-10-24 RX ADMIN — Medication 10 ML: at 20:49

## 2024-10-24 RX ADMIN — LEVETIRACETAM 500 MG: 500 TABLET, FILM COATED ORAL at 20:48

## 2024-10-24 RX ADMIN — ENOXAPARIN SODIUM 40 MG: 100 INJECTION SUBCUTANEOUS at 15:38

## 2024-10-24 NOTE — ED NOTES
Pt denies any seizure hx. Pt states that at least one of her children saw her experience a seizure at approx 1 am while sleeping. Pt went to bed at 9pm, feeling well and normal, not having been sick in any way recently. Pt's last memory before sz was of lying down to go to sleep. Pt's next memory was of waking up after sz activity.

## 2024-10-24 NOTE — H&P
"[facility] Medicine Services  History & Physical    Patient Name: Charlotte Lynn  : 1990  MRN: [id number]  Primary Care Physician:  Brii Silva APRN  Date of admission: 10/24/2024  Date and Time of Service: 10/24/2024 at 0402    Subjective      Chief Complaint: \"seizure\"    History of Present Illness: Charlotte Lynn is a 34 y.o. female with a pseudotumor cerebri, WPW S/P ablation, who presented to [facility] on 10/24/2024 after losing consciousness and having generalized stiffness at home. History predominantly obtained from both patient's parents at bedside. Patient apparently was on her usual state of health, went to bed and fell and sleep then was found on the floor with the head on the bed. Per parents, patient was stiff, not responding and had foamy mouth. Patient does not remember any of this. The episodes lasted around 3 minutes and she remained confused for around 10 minutes afterwards. No report of urinary/stool incontinence today however she did urinated on the bed 3 days ago which is unusual. Patient describes heaviness bilateral LE and dizziness. Denies headache or visual disturbance, vomiting, fever, chills. She states has not been taking her metoprolol over the past few days as she only had a few pills left. In ED, she is afebrile, normotensive, saturating well on room air. No seizure has occurred during ED stay. CBC and chemistry labs grossly normal. The decision to admit was made.       Review of Systems   Constitutional:  Positive for fatigue. Negative for chills and fever.   HENT:  Negative for drooling.    Respiratory:  Negative for choking and shortness of breath.    Genitourinary:  Negative for dysuria, flank pain and hematuria.   Musculoskeletal:  Negative for back pain.   Neurological:  Positive for dizziness, seizures and syncope. Negative for tremors, facial asymmetry, weakness, light-headedness, numbness and headaches.       Personal History     No " past medical history on file.    Past Surgical History:   Procedure Laterality Date    OTHER SURGICAL HISTORY      abaltion on heart       Family History: family history is not on file. Otherwise pertinent FHx was reviewed and not pertinent to current issue.    Social History:  reports that she has never smoked. She has never used smokeless tobacco. She reports current alcohol use. She reports that she does not use drugs.    Home Medications:  Prior to Admission Medications       Prescriptions Last Dose Informant Patient Reported? Taking?    metoprolol succinate XL (TOPROL-XL) 50 MG 24 hr tablet Past Week  Yes Yes    Take 1 tablet by mouth Daily.              Allergies:  No Known Allergies    Objective      Vitals:   Temp:  [98.2 °F (36.8 °C)] 98.2 °F (36.8 °C)  Heart Rate:  [69-96] 69  Resp:  [18] 18  BP: (107-114)/(72-89) 109/72  Body mass index is 34.9 kg/m².  Physical Exam  Constitutional:       General: She is not in acute distress.     Appearance: Normal appearance.   HENT:      Head: Normocephalic.      Nose: Nose normal.   Eyes:      Extraocular Movements: Extraocular movements intact.      Pupils: Pupils are equal, round, and reactive to light.   Cardiovascular:      Rate and Rhythm: Normal rate and regular rhythm.      Pulses: Normal pulses.      Heart sounds: Normal heart sounds.   Pulmonary:      Effort: Pulmonary effort is normal.      Breath sounds: Normal breath sounds.   Abdominal:      General: Abdomen is flat. There is no distension.      Palpations: Abdomen is soft.      Tenderness: There is no abdominal tenderness.   Musculoskeletal:         General: Normal range of motion.      Cervical back: Neck supple. No rigidity.   Skin:     General: Skin is warm.      Capillary Refill: Capillary refill takes less than 2 seconds.   Neurological:      General: No focal deficit present.      Mental Status: She is alert and oriented to person, place, and time.   Psychiatric:         Behavior: Behavior normal.          Diagnostic Data:  Lab Results (last 24 hours)       Procedure Component Value Units Date/Time    Pregnancy, Urine - Urine, Clean Catch [[id number]]  (Normal) Collected: 10/24/24 0241    Specimen: Urine, Clean Catch Updated: 10/24/24 0326     HCG, Urine QL Negative    Basic Metabolic Panel [[id number]]  (Abnormal) Collected: 10/24/24 0246    Specimen: Blood Updated: 10/24/24 0311     Glucose 105 mg/dL      BUN 11 mg/dL      Creatinine 0.66 mg/dL      Sodium 139 mmol/L      Potassium 3.9 mmol/L      Chloride 105 mmol/L      CO2 23.7 mmol/L      Calcium 9.2 mg/dL      BUN/Creatinine Ratio 16.7     Anion Gap 10.3 mmol/L      eGFR 119.7 mL/min/1.73     Narrative:      GFR Normal >60  Chronic Kidney Disease <60  Kidney Failure <15      Urine Drug Screen - Urine, Clean Catch [[id number]]  (Normal) Collected: 10/24/24 0241    Specimen: Urine, Clean Catch Updated: 10/24/24 0305     THC, Screen, Urine Negative     Phencyclidine (PCP), Urine Negative     Cocaine Screen, Urine Negative     Methamphetamine, Ur Negative     Opiate Screen Negative     Amphetamine Screen, Urine Negative     Benzodiazepine Screen, Urine Negative     Tricyclic Antidepressants Screen Negative     Methadone Screen, Urine Negative     Barbiturates Screen, Urine Negative     Oxycodone Screen, Urine Negative     Buprenorphine, Screen, Urine Negative    Narrative:      Cutoff For Drugs Screened:    Amphetamines               500 ng/ml  Barbiturates               200 ng/ml  Benzodiazepines            150 ng/ml  Cocaine                    150 ng/ml  Methadone                  200 ng/ml  Opiates                    100 ng/ml  Phencyclidine               25 ng/ml  THC                         50 ng/ml  Methamphetamine            500 ng/ml  Tricyclic Antidepressants  300 ng/ml  Oxycodone                  100 ng/ml  Buprenorphine               10 ng/ml    The normal value for all drugs tested is negative. This report includes unconfirmed screening results,  with the cutoff values listed, to be used for medical treatment purposes only.  Unconfirmed results must not be used for non-medical purposes such as employment or legal testing.  Clinical consideration should be applied to any drug of abuse test, particularly when unconfirmed results are used.    All urine drugs of abuse requests without chain of custody are for medical screening purposes only.  False positives are possible.      CBC & Differential [[id number]]  (Normal) Collected: 10/24/24 0246    Specimen: Blood Updated: 10/24/24 0252    Narrative:      The following orders were created for panel order CBC & Differential.  Procedure                               Abnormality         Status                     ---------                               -----------         ------                     CBC Auto Differential[[id number]]        Normal              Final result                 Please view results for these tests on the individual orders.    CBC Auto Differential [[id number]]  (Normal) Collected: 10/24/24 0246    Specimen: Blood Updated: 10/24/24 0252     WBC 9.28 10*3/mm3      RBC 4.57 10*6/mm3      Hemoglobin 13.0 g/dL      Hematocrit 38.2 %      MCV 83.6 fL      MCH 28.4 pg      MCHC 34.0 g/dL      RDW 12.3 %      RDW-SD 37.5 fl      MPV 9.8 fL      Platelets 207 10*3/mm3      Neutrophil % 71.4 %      Lymphocyte % 19.7 %      Monocyte % 6.4 %      Eosinophil % 1.7 %      Basophil % 0.5 %      Immature Grans % 0.3 %      Neutrophils, Absolute 6.62 10*3/mm3      Lymphocytes, Absolute 1.83 10*3/mm3      Monocytes, Absolute 0.59 10*3/mm3      Eosinophils, Absolute 0.16 10*3/mm3      Basophils, Absolute 0.05 10*3/mm3      Immature Grans, Absolute 0.03 10*3/mm3      nRBC 0.0 /100 WBC              Imaging Results (Last 24 Hours)       ** No results found for the last 24 hours. **              Assessment & Plan        This is a 34 y.o. female with:    Active and Resolved Problems  Active Hospital Problems     Diagnosis  POA    **Seizure [R56.9]  Yes      Resolved Hospital Problems   No resolved problems to display.       Possible onset seizure vs syncope  History of idiopathic intracranial hypertension  History of WPW  Checking brain MRI  Checking EEG  Seizure precautions  PRN ativan if seizure occurs  Continuous cardiac monitoring  Resume home dose Metoprolol XL 50 mg once daily  Cardiology and Neurology consultation        VTE Prophylaxis:  Pharmacologic VTE prophylaxis orders are present.        The patient desires to be as follows:    CODE STATUS:       Full code    Esther Lynn, who can be contacted at , is the designated person to make medical decisions on the patient's behalf if She is incapable of doing so. This was clarified with patient and/or next of kin on 10/24/2024 during the course of this H&P.    Admission Status:  I believe this patient meets Observation status.    Expected Length of Stay: 24-48 hours    PDMP and Medication Dispenses via Sidebar reviewed and consistent with patient reported medications.    I discussed the patient's findings and my recommendations with patient, family, and nursing staff.      Signature:     This document has been electronically signed by Carlos Llanes Alvarez, MD on October 24, 2024 04:02 EDT   [facility] Hospitalist Team

## 2024-10-24 NOTE — PLAN OF CARE
Problem: Adult Inpatient Plan of Care  Goal: Plan of Care Review  Outcome: Progressing  Goal: Patient-Specific Goal (Individualized)  Outcome: Progressing  Goal: Absence of Hospital-Acquired Illness or Injury  Outcome: Progressing  Intervention: Identify and Manage Fall Risk  Recent Flowsheet Documentation  Taken 10/24/2024 1231 by Nuha Lea RN  Safety Promotion/Fall Prevention:   safety round/check completed   activity supervised   assistive device/personal items within reach   clutter free environment maintained   fall prevention program maintained   gait belt   nonskid shoes/slippers when out of bed  Goal: Optimal Comfort and Wellbeing  Outcome: Progressing  Intervention: Provide Person-Centered Care  Recent Flowsheet Documentation  Taken 10/24/2024 1231 by Nuha Lea RN  Trust Relationship/Rapport:   care explained   choices provided  Goal: Readiness for Transition of Care  Outcome: Progressing     Problem: Comorbidity Management  Goal: Maintenance of Seizure Control  Outcome: Progressing  Intervention: Maintain Seizure Symptom Control  Recent Flowsheet Documentation  Taken 10/24/2024 1231 by Nuha Lea RN  Medication Review/Management: medications reviewed     Problem: Seizure, Active Management  Goal: Absence of Seizure/Seizure-Related Injury  Outcome: Progressing   Goal Outcome Evaluation: Patient resting in bed. Denies needs at this time.

## 2024-10-24 NOTE — CONSULTS
Referring Provider: Dr. Rojelio MARTIN  Reason for Consultation: Possible syncope    Chief complaint possible syncope    Cardiology assessment and plan    Possible syncope versus seizure  History of idiopathic intracranial hypertension  Pseudotumor cerebri  History of WPW/status post ablation therapy  Status post SVT ablation in 2015  Abnormal EEG  Possible seizure currently being treated by neurology    Denies any new cardiac symptoms  Alert and awake  Discussed with patient family at bedside  Tmax is 98.2 pulse is 66 respirations are 18 blood pressure is 100/70 sats are 97%  Sodium is 139 potassium is 3.9 creatinine is 0.6 hemoglobin is 13.0  Twelve-lead EKG shows normal sinus rhythm short NM interval and EKG pattern consistent with WPW syndrome  Recommend extended Holter monitor for 4 weeks at the time of discharge  Diagnosis and treatment options reviewed and discussed with patient  Unlikely patient had a syncopal episode  Diagnosis and treatment options reviewed and discussed with patient and family  Further recommendation based on patient    History of present illness:  Charlotte Lynn is a 34 y.o. female who presents with past medical history that is significant for history of WPW syndrome status post ablation and also history of pseudotumor cerebri presented with an episode of loss of consciousness generalized seizure activity that was witnessed by family members cardiology was consulted for further evaluation and treatment options the prior history of WPW syndrome.   Patient has some intermittent palpitations that is chronic otherwise there are no episodes of prolonged palpitations or increased heart rate  Her episode suggestive of most likely seizure activity based on the description and history  No new cardiac symptoms  Patient has been stable and following up with a cardiologist outpatient  Compliant with medical therapy with beta-blockers  Review of Systems  Review of Systems   Constitutional: Negative  for chills, decreased appetite and malaise/fatigue.   HENT:  Negative for congestion and nosebleeds.    Eyes:  Negative for blurred vision and double vision.   Cardiovascular:  Negative for chest pain, dyspnea on exertion, irregular heartbeat, leg swelling, near-syncope, orthopnea, palpitations and paroxysmal nocturnal dyspnea.   Respiratory:  Negative for cough and shortness of breath.    Hematologic/Lymphatic: Negative for adenopathy. Does not bruise/bleed easily.   Skin:  Negative for color change and rash.   Musculoskeletal:  Negative for back pain and joint pain.   Gastrointestinal:  Negative for bloating, abdominal pain, hematemesis and hematochezia.   Genitourinary:  Negative for flank pain and hematuria.   Neurological:  Negative for dizziness and focal weakness.   Psychiatric/Behavioral:  Negative for altered mental status. The patient does not have insomnia.        Past Medical History  No past medical history on file. and   Past Surgical History:   Procedure Laterality Date    OTHER SURGICAL HISTORY      abaltion on heart       Family History  No family history on file.    Social History  Social History     Socioeconomic History    Marital status: Single   Tobacco Use    Smoking status: Never    Smokeless tobacco: Never   Vaping Use    Vaping status: Every Day    Substances: Nicotine    Passive vaping exposure: Yes   Substance and Sexual Activity    Alcohol use: Yes     Comment: rare    Drug use: Never    Sexual activity: Defer       Objective     Physical Exam:  Constitutional:       Appearance: Well-developed.   Eyes:      Conjunctiva/sclera: Conjunctivae normal.      Pupils: Pupils are equal, round, and reactive to light.   HENT:      Head: Normocephalic and atraumatic.   Neck:      Thyroid: No thyromegaly.   Pulmonary:      Effort: Pulmonary effort is normal.      Breath sounds: Normal breath sounds.   Cardiovascular:      Normal rate. Regular rhythm.   Pulses:     Intact distal pulses.   Edema:      "Peripheral edema absent.   Abdominal:      General: Bowel sounds are normal.      Palpations: Abdomen is soft.   Musculoskeletal:      Cervical back: Normal range of motion and neck supple. Skin:     General: Skin is warm.   Neurological:      Mental Status: Alert and oriented to person, place, and time.         Vital Signs  Vitals:    10/24/24 0331 10/24/24 0335 10/24/24 0417 10/24/24 0643   BP: 109/72  101/71    BP Location:       Patient Position:       Pulse: 69  66    Resp:       Temp:       TempSrc:       SpO2: 99%  97%    Weight:  89.4 kg (197 lb)  89.4 kg (197 lb)   Height:    160 cm (63\")       Weight  Flowsheet Rows      Flowsheet Row First Filed Value   Admission Height 160 cm (63\") Documented at 10/24/2024 0211   Admission Weight 89.4 kg (197 lb) Documented at 10/24/2024 0335                Results Review:  Lab Results (last 24 hours)       Procedure Component Value Units Date/Time    Pregnancy, Urine - Urine, Clean Catch [[id number]]  (Normal) Collected: 10/24/24 0241    Specimen: Urine, Clean Catch Updated: 10/24/24 0326     HCG, Urine QL Negative    Basic Metabolic Panel [[id number]]  (Abnormal) Collected: 10/24/24 0246    Specimen: Blood Updated: 10/24/24 0311     Glucose 105 mg/dL      BUN 11 mg/dL      Creatinine 0.66 mg/dL      Sodium 139 mmol/L      Potassium 3.9 mmol/L      Chloride 105 mmol/L      CO2 23.7 mmol/L      Calcium 9.2 mg/dL      BUN/Creatinine Ratio 16.7     Anion Gap 10.3 mmol/L      eGFR 119.7 mL/min/1.73     Narrative:      GFR Normal >60  Chronic Kidney Disease <60  Kidney Failure <15      Urine Drug Screen - Urine, Clean Catch [[id number]]  (Normal) Collected: 10/24/24 0241    Specimen: Urine, Clean Catch Updated: 10/24/24 0305     THC, Screen, Urine Negative     Phencyclidine (PCP), Urine Negative     Cocaine Screen, Urine Negative     Methamphetamine, Ur Negative     Opiate Screen Negative     Amphetamine Screen, Urine Negative     Benzodiazepine Screen, Urine Negative     " Tricyclic Antidepressants Screen Negative     Methadone Screen, Urine Negative     Barbiturates Screen, Urine Negative     Oxycodone Screen, Urine Negative     Buprenorphine, Screen, Urine Negative    Narrative:      Cutoff For Drugs Screened:    Amphetamines               500 ng/ml  Barbiturates               200 ng/ml  Benzodiazepines            150 ng/ml  Cocaine                    150 ng/ml  Methadone                  200 ng/ml  Opiates                    100 ng/ml  Phencyclidine               25 ng/ml  THC                         50 ng/ml  Methamphetamine            500 ng/ml  Tricyclic Antidepressants  300 ng/ml  Oxycodone                  100 ng/ml  Buprenorphine               10 ng/ml    The normal value for all drugs tested is negative. This report includes unconfirmed screening results, with the cutoff values listed, to be used for medical treatment purposes only.  Unconfirmed results must not be used for non-medical purposes such as employment or legal testing.  Clinical consideration should be applied to any drug of abuse test, particularly when unconfirmed results are used.    All urine drugs of abuse requests without chain of custody are for medical screening purposes only.  False positives are possible.      CBC & Differential [[id number]]  (Normal) Collected: 10/24/24 0246    Specimen: Blood Updated: 10/24/24 0252    Narrative:      The following orders were created for panel order CBC & Differential.  Procedure                               Abnormality         Status                     ---------                               -----------         ------                     CBC Auto Differential[[id number]]        Normal              Final result                 Please view results for these tests on the individual orders.    CBC Auto Differential [[id number]]  (Normal) Collected: 10/24/24 0246    Specimen: Blood Updated: 10/24/24 0252     WBC 9.28 10*3/mm3      RBC 4.57 10*6/mm3      Hemoglobin 13.0  g/dL      Hematocrit 38.2 %      MCV 83.6 fL      MCH 28.4 pg      MCHC 34.0 g/dL      RDW 12.3 %      RDW-SD 37.5 fl      MPV 9.8 fL      Platelets 207 10*3/mm3      Neutrophil % 71.4 %      Lymphocyte % 19.7 %      Monocyte % 6.4 %      Eosinophil % 1.7 %      Basophil % 0.5 %      Immature Grans % 0.3 %      Neutrophils, Absolute 6.62 10*3/mm3      Lymphocytes, Absolute 1.83 10*3/mm3      Monocytes, Absolute 0.59 10*3/mm3      Eosinophils, Absolute 0.16 10*3/mm3      Basophils, Absolute 0.05 10*3/mm3      Immature Grans, Absolute 0.03 10*3/mm3      nRBC 0.0 /100 WBC           Imaging Results (Last 72 Hours)       Procedure Component Value Units Date/Time    MRI Brain Without Contrast [[id number]] Collected: 10/24/24 0539     Updated: 10/24/24 0556    Narrative:      MRI BRAIN WO CONTRAST    Date of Exam: 10/24/2024 5:10 AM EDT    Indication: seizure.     Comparison: None available.    Technique:  Routine multiplanar/multisequence sequence images of the brain were obtained without contrast administration.      Findings:  There is no diffusion restriction to suggest acute infarct. There is no evidence of acute or chronic intracranial hemorrhage. There are again noted prominent perivascular spaces in the right precentral gyrus. There is a tiny focus of FLAIR hyperintense   signal in this region in the juxtacortical white matter. The hippocampus appears normal bilaterally with respect to signal and architecture. No temporal lobe lesions. No obvious gray matter heterotopia or cortical dysplasia. No mass effect or midline   shift. No abnormal extra-axial collections.  The major vascular flow voids appear intact. The basal ganglia, brainstem and cerebellum appear within normal limits.  Calvarial and superficial soft tissue signal is within normal limits.  Orbits appear   unremarkable.  The paranasal sinuses and the mastoid air cells appear well aerated.  Midline structures are intact.       Impression:       "Impression:    1. No acute intracranial abnormality. No clear etiology for seizures.  2. Redemonstration of signal intensity cystic areas in the right precentral gyrus subcortical white matter most consistent with prominent perivascular spaces.        Electronically Signed: Dima Castellon MD    10/24/2024 5:54 AM EDT    Workstation ID: [id number]                Medication Review  Scheduled Meds:enoxaparin, 40 mg, Subcutaneous, Q24H  sodium chloride, 10 mL, Intravenous, Q12H      Continuous Infusions:   PRN Meds:.  senna-docusate sodium **AND** polyethylene glycol **AND** bisacodyl **AND** bisacodyl    LORazepam    nitroglycerin    [COMPLETED] Insert Peripheral IV **AND** sodium chloride    sodium chloride    Lab Results   Component Value Date    GLUCOSE 105 (H) 10/24/2024    BUN 11 10/24/2024    CREATININE 0.66 10/24/2024    EGFR 119.7 10/24/2024    BCR 16.7 10/24/2024    K 3.9 10/24/2024    CO2 23.7 10/24/2024    CALCIUM 9.2 10/24/2024    ALBUMIN 4.7 09/04/2022    BILITOT 0.9 09/04/2022    AST 16 09/04/2022    ALT 19 09/04/2022     No results found for this or any previous visit.        No results found for: \"CHOL\", \"CHLPL\", \"TRIG\", \"HDL\", \"LDL\", \"LDLDIRECT\"         Assessment & Plan       Seizure          Haley Mckeon MD  10/24/24  06:52 EDT               "

## 2024-10-24 NOTE — CASE MANAGEMENT/SOCIAL WORK
Social Work Assessment  [facility]     Patient Name: Charlotte Lynn  MRN: [id number]  Today's Date: 10/24/2024    Admit Date: 10/24/2024     Demographic Summary       Row Name 10/24/24 7512       General Information    Reason for Consult mental health concerns    General Information Comments SW was consulted re: stress/mental health issues. SW attempted to meet with pt for intervention, but pt with provider.             TOMMY De León, LSW  Medical Social Worker  Ph [phone]  Fax [phone]  [email or web address]

## 2024-10-24 NOTE — PROCEDURES
This is an inpatient,   Digitally recorded multi-montage EEG with leads placed according to the international 10/20 system  Photic stimulation was done  Hyperventilation was done    With the patient fully aroused, the background was 10 to 11 Hz posterior dominant alpha rhythm which was symmetric and attenuated with eyes opening    Mostly asleep with spindles  No asymmetry    Throughout the record there was phase reversing, it was bilateral but at times you could see more on the right side.  The face is worsening at times between F7 and T3, F8 and T4, F4 and C4 and also sometime F3 and C3 there was occasional rhythmic theta about 6 Hz    No clinical events    Hyperventilation was attempted but I did not see any significant changes    Photic summation was attempted in intermittent stepwise pattern after the flash frequency of 30 Hz but I did not see any driving, asymmetry or paroxysmal activity      Impression:    This is an abnormal adult awake, drowsy and asleep EEG    No abnormalities almost spike and wave type discharges, bilaterally but may be more common in the frontocentral regions on the right side    Clinical correlation is recommended

## 2024-10-24 NOTE — CONSULTS
Primary Care Provider: Brii Silva APRN     Consult requested by: Dr. Llanes Alvarez     Reason for Consultation: Neurological evaluation, possible seizure     History taken from: patient chart family RN    Chief complaint: unresponsive, possible seizure     SUBJECTIVE:    History of present illness: Background per H&P:    Charlotte Lynn is a 34 y.o. female with a pseudotumor cerebri, WPW S/P ablation, who presented to [facility] on 10/24/2024 after losing consciousness and having generalized stiffness at home. History predominantly obtained from both patient's parents at bedside. Patient apparently was on her usual state of health, went to bed and fell and sleep then was found on the floor with the head on the bed. Per parents, patient was stiff, not responding and had foamy mouth. Patient does not remember any of this. The episodes lasted around 3 minutes and she remained confused for around 10 minutes afterwards. No report of urinary/stool incontinence today however she did urinated on the bed 3 days ago which is unusual. Patient describes heaviness bilateral LE and dizziness. Denies headache or visual disturbance, vomiting, fever, chills. She states has not been taking her metoprolol over the past few days as she only had a few pills left. In ED, she is afebrile, normotensive, saturating well on room air. No seizure has occurred during ED stay. CBC and chemistry labs grossly normal. The decision to admit was made.     - Portions of the above HPI were copied from previous encounters and edited as appropriate. PMH as detailed below.     Hx as above.Pt did have a witnessed seizure by family, no known hx of seizures in the past. Patient did have a postictal state and continued to feel weak throughout the day. She is slowly starting to feel back to herself. She does have a headache but says that is normal for her. She is wanting to go home tonight to see her children.     Patient has been worked  up outpatient for pseudotumor cerebri. 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 She is following with a neurosurgeon in [place].       Review of Systems   Constitutional: Negative.    HENT: Negative.     Eyes: Negative.    Respiratory: Negative.     Cardiovascular: Negative.    Gastrointestinal:  Negative for nausea and vomiting.   Endocrine: Negative.    Genitourinary: Negative.    Musculoskeletal: Negative.    Skin: Negative.    Allergic/Immunologic: Negative.    Neurological:  Positive for seizures. Negative for dizziness, tremors, syncope, facial asymmetry, speech difficulty, weakness, light-headedness, numbness and headaches.   Hematological: Negative.    Psychiatric/Behavioral:  Negative for confusion.         PATIENT HISTORY:  No past medical history on file.,   Past Surgical History:   Procedure Laterality Date    OTHER SURGICAL HISTORY      abaltion on heart   , No family history on file.,   Social History     Tobacco Use    Smoking status: Never    Smokeless tobacco: Never   Vaping Use    Vaping status: Every Day    Substances: Nicotine    Passive vaping exposure: Yes   Substance Use Topics    Alcohol use: Yes     Comment: rare    Drug use: Never   ,   Prior to Admission medications    Medication Sig Start Date End Date Taking? Authorizing Provider   metoprolol succinate XL (TOPROL-XL) 50 MG 24 hr tablet Take 1 tablet by mouth Daily.   Yes ProviderLiberty MD    Allergies:  Patient has no known allergies.    Current Facility-Administered Medications   Medication Dose Route Frequency Provider Last Rate Last Admin    sennosides-docusate (PERICOLACE) 8.6-50 MG per tablet 2 tablet  2 tablet Oral BID PRN Llanes Alvarez, Carlos, MD        And    polyethylene glycol (MIRALAX) packet 17 g  17 g Oral Daily PRN Llanes Alvarez, Carlos, MD        And    bisacodyl (DULCOLAX) EC tablet 5 mg  5 mg Oral Daily PRN Llanes Alvarez, Carlos, MD        And    bisacodyl (DULCOLAX)  suppository 10 mg  10 mg Rectal Daily PRN Llanes Alvarez, Carlos, MD        Enoxaparin Sodium (LOVENOX) syringe 40 mg  40 mg Subcutaneous Q24H Llanes Alvarez, Carlos, MD        LORazepam (ATIVAN) injection 1 mg  1 mg Intravenous Q4H PRN Llanes Alvarez, Carlos, MD        nitroglycerin (NITROSTAT) SL tablet 0.4 mg  0.4 mg Sublingual Q5 Min PRN Llanes Alvarez, Carlos, MD        sodium chloride 0.9 % flush 10 mL  10 mL Intravenous PRN Pablo Tracy MD        sodium chloride 0.9 % flush 10 mL  10 mL Intravenous Q12H Llanes Alvarez, Carlos, MD        sodium chloride 0.9 % flush 10 mL  10 mL Intravenous PRN Llanes Alvarez, Carlos, MD         Current Outpatient Medications   Medication Sig Dispense Refill    metoprolol succinate XL (TOPROL-XL) 50 MG 24 hr tablet Take 1 tablet by mouth Daily.          ________________________________________________________        OBJECTIVE:    PHYSICAL EXAM:    Constitutional: The patient is in no apparent distress, bright awake and alert. There is no shortness of breath.     PSYCHIATRIC: Mood/affect normal, judgement normal, appropriate    HEENT: There is no tenderness over the temporal arteries bilaterally. Normocephalic, atraumatic.     Chest: Breathing unlabored    Cardiac: Regular rate and rhythm.     Extremities:  No clubbing, cyanosis or edema.    NEUROLOGICAL:    Cognition:   Fully oriented.  Fund of knowledge excellent.  Concentration and attention normal.   Language normal with normal comprehension, fluent speech, intact repetition and naming.   Short and long term memory appears intact    Cranial nerves;    II - pupils bilaterally equal reacting to light,  No new Visual field deficits;  Fundoscopic exam- Not able to be done, non-dilated exam  III,IV,VI: EOMI with no diplopia  V: Normal facial sensations  VII: No facial asymmetry,  VIII: No New hearing abnormality  IX, X, XI: normal gag and shoulder shrug;  XII: tongue is in the midline.    Sensory:  Intact to light touch in  all extremities.     Motor: Strength 5/5 bilaterally upper and lower extremities. No involuntary movements present. Normal tone and bulk.    Cerebellar: Finger to nose and mirror movements normal bilaterally.    Gait and balance: Deferred.     Physical exam performed by EDA Borges.     ________________________________________________________   RESULTS REVIEW:    VITAL SIGNS:   Temp:  [98.2 °F (36.8 °C)] 98.2 °F (36.8 °C)  Heart Rate:  [62-96] 71  Resp:  [16-18] 16  BP: ()/(61-89) 100/66     LABS:      Lab 10/24/24  0246   WBC 9.28   HEMOGLOBIN 13.0   HEMATOCRIT 38.2   PLATELETS 207   NEUTROS ABS 6.62   IMMATURE GRANS (ABS) 0.03   LYMPHS ABS 1.83   MONOS ABS 0.59   EOS ABS 0.16   MCV 83.6         Lab 10/24/24  0246   SODIUM 139   POTASSIUM 3.9   CHLORIDE 105   CO2 23.7   ANION GAP 10.3   BUN 11   CREATININE 0.66   EGFR 119.7   GLUCOSE 105*   CALCIUM 9.2                             Lab Results   Component Value Date    TSH 1.310 09/30/2020    HGBA1C 5.5 09/30/2020       IMAGING STUDIES:  MRI Brain Without Contrast    Result Date: 10/24/2024  Impression: 1. No acute intracranial abnormality. No clear etiology for seizures. 2. Redemonstration of signal intensity cystic areas in the right precentral gyrus subcortical white matter most consistent with prominent perivascular spaces. Electronically Signed: Dima Castellon MD  10/24/2024 5:54 AM EDT  Workstation ID: [id number]     I reviewed the patient's new clinical results.    ________________________________________________________     PROBLEM LIST:    Seizure            ASSESSMENT/PLAN:     Single seizure with abnormal EEG  - MRI brain reviewed-there is 2 areas of likely cystic changes in the right precentral gyrus subcortical white matter.    -- Check MRA head to rule out underlying AVM  -EEG: Was abnormal showing spikes/increased discharges more common in the frontocentral regions on the right side.  - EKG: Sinus rhythm, rate 61  - Labs: TSH: P, Mag: P,  Phos: P, Na: 139 , Ca: 9.2   - Follow up with Neurology outpatient for management  - Seizure precuations (see below)   - Maintain healthy lifestyle including stress management, routine sleep schedule, and a well balanced diet.  Alcohol and drugs lowers the seizure threshold and should be avoided. Take all medications as prescribed.    Plan: Patient had a single event but her EEG was abnormal therefore recommending to start Keppra 500 mg twice daily. Side effects and directions discussed with patient and family. Will check MRA head to r/o AVM. No driving/state laws discussed with patient. Will recommend follow up with outpatient Neurology in 1-2 months.     There are no further recommendations. Will sign off, please call with any questions or concerns.    I discussed the patient's findings and my recommendations with patient, family, and nursing staff    MIGUELITO Hart  10/24/24  10:18 EDT

## 2024-10-24 NOTE — SIGNIFICANT NOTE
"   10/24/24 0946   Living Situation   Current Living Arrangements home   Potentially Unsafe Housing Conditions none   Food Insecurity   Within the past 12 months, you worried that your food would run out before you got the money to buy more. Never true   Within the past 12 months, the food you bought just didn't last and you didn't have money to get more. Never true   Transportation Needs   In the past 12 months, has lack of transportation kept you from medical appointments or from getting medications? no   In the past 12 months, has lack of transportation kept you from meetings, work, or from getting things needed for daily living? No   Utilities   In the past 12 months has the electric, gas, oil, or water company threatened to shut off services in your home? No   Abuse Screen (yes response referral indicated)   Feels Unsafe at Home or Work/School no   Feels Threatened by Someone no   Does Anyone Try to Keep You From Having Contact with Others or Doing Things Outside Your Home? no   Physical Signs of Abuse Present no   Financial Resource Strain   How hard is it for you to pay for the very basics like food, housing, medical care, and heating? Not hard   Employment   Do you want help finding or keeping work or a job? I do not need or want help   Family and Community Support   If for any reason you need help with day-to-day activities such as bathing, preparing meals, shopping, managing finances, etc., do you get the help you need? I don't need any help   How often do you feel lonely or isolated from those around you? Never   Education   Preferred Language English   Do you want help with school or training? For example, starting or completing job training or getting a high school diploma, GED or equivalent No   Physical Activity   On average, how many days per week do you engage in moderate to strenuous exercise (like a brisk walk)? 5 days  (\"at work\")   On average, how many minutes do you engage in exercise at this " "level? Pt Unable   Alcohol Use   Q1: How often do you have a drink containing alcohol? Monthly or l   Q2: How many drinks containing alcohol do you have on a typical day when you are drinking? 1 or 2   Q3: How often do you have six or more drinks on one occasion? Never   Stress   Do you feel stress - tense, restless, nervous, or anxious, or unable to sleep at night because your mind is troubled all the time - these days? To some exte   Mental Health   Little interest or pleasure in doing things Several days  (Pt reports, \" Occassionally\" which isn't an option to click)   Feeling down, depressed, or hopeless Several days  (Pt reports, \" Occassionally\" which isn't an option to click)   Disabilities   Difficulty Concentrating, Remembering or Making Decisions no   Difficulty Managing Errands Independently no     Social workers informed of need for follow-up regarding SDOH responses.    Yanni Carballo RN, [facility]  Office: [phone]  Fax: [phone]  [email or web address]      I met with patient in room wearing PPE: mask    Maintained distance greater than six feet and spent </=15 minutes in the room  "

## 2024-10-24 NOTE — ED PROVIDER NOTES
Subjective   History of Present Illness  34-year-old female presents after probable seizure at home.  Parents report they went into the bedroom after patient's daughter alerted them that something was wrong.  They found her against bed stiff rigid.  Reports this lasted about 3 minutes.  It was then 10 to 15 minutes before she started to respond.  Patient reports no headache at this time.  She is amnestic to events.  She had been on metoprolol for endocranial hypertension and has been out for about 6 days.  She had also been taking a adrenal supplement with herb activator.  She is having no visual complaints at this time.  No localized numbness weakness paresthesia extremities.  She states her legs just feel heavy.  Review of Systems    Past Medical History:   Diagnosis Date    IIH (idiopathic intracranial hypertension) 2012       No Known Allergies    Past Surgical History:   Procedure Laterality Date    CARDIAC ABLATION  2015    due to WPW.    LUMBAR PUNCTURE      to drain fluid off brain. multiple taps have been performed.    OTHER SURGICAL HISTORY      abaltion on heart       History reviewed. No pertinent family history.    Social History     Socioeconomic History    Marital status: Single   Tobacco Use    Smoking status: Never    Smokeless tobacco: Never   Vaping Use    Vaping status: Every Day    Substances: Nicotine    Passive vaping exposure: Yes   Substance and Sexual Activity    Alcohol use: Yes     Comment: rare    Drug use: Never    Sexual activity: Defer           Objective   Physical Exam  34-year-old female awake alert.  Generally well-developed well-nourished.  Pupils equal round react light.  Funduscopic exam discs sharp right eye.  Left disc minimally blurred.  No hemorrhage.  Oropharynx appears that there is minor contusion to right side of tongue.  Neck supple chest clear cardiovascular related rhythm abdomen soft nontender Neurologic exam cranials 2 through 12 grossly intact hands without  pronator drift finger-nose intact speech clear motor sensor intact to extremities without deficit  Procedures           ED Course                                               Medical Decision Making    Chart review: Patient was noted to have LP August of this year that showed opening pressure of 19 decreased to 6:12 cc removed.  She had CT venogram on the second of this month which was normal with the exception of cystic lesion within the right centrum semiovale similar to prior exams which could represent a prominent.  Vascular space.  She had MRI brain with and without contrast July of this year that showed no acute intracranial abnormality.  Intracranial vascular flow voids appeared patent.  There was noted to be prominent.  Vascular spaces within the right centrum semiovale.  Comorbidity: As per past history   Differential: Seizure, electrode abnormality,  My EKG interpretation: Sinus rhythm rate of 67 on telemetry  Lab: Negative urine drug screen normal CBC normal basic metabolic panel with glucose of 105, urine hCG negative  My Radiology review and interpretation: CT scan considered but in light of recent MRI and CT scan not felt to be indicated  Discussion/treatment: Findings were discussed with her and parents.  They reported that she had an episode over the weekend when she was asleep where she had urinary incontinence which is something that never happened before.  Patient was discussed with the hospitalist.  Will place in observation to their service with neurology consultation.  Patient was evaluated using appropriate PPE    Final diagnoses:   Seizure       ED Disposition  ED Disposition       ED Disposition   Decision to Admit    Condition   --    Comment   Level of Care: Telemetry [5]   Diagnosis: Seizure [205090]   Admitting Physician: LLANES ALVAREZ, CARLOS [[id number]]   Attending Physician: LLANES ALVAREZ, CARLOS [[id number]]                 Delmer Dyer MD  [address]  [address]  [place]  [address]  [phone]    Go on 10/30/2024  Go to this appointment on Wednesday, October 30, 2024. Your appointment time is 2:00pm.  Call the office if you need to make any changes to the appointment.    Seipel, Joseph F, MD  [address] [place] [address]  [phone]               Medication List      No changes were made to your prescriptions during this visit.            Pablo Tracy MD  10/24/24 7981

## 2024-10-24 NOTE — CASE MANAGEMENT/SOCIAL WORK
Discharge Planning Assessment   Yousuf     Patient Name: Charlotte Lynn  MRN: [id number]  Today's Date: 10/24/2024    Admit Date: 10/24/2024    Plan: Home with family. Notify new PCP if patient not dc'd by 10/29/24   Discharge Needs Assessment       Row Name 10/24/24 0930       Living Environment    People in Home child(flor), dependent;parent(s)    Name(s) of People in Home She and her children (ages 7&11) live with her parents Esther and Mikel    Current Living Arrangements home    Potentially Unsafe Housing Conditions none    In the past 12 months has the electric, gas, oil, or water company threatened to shut off services in your home? No    Primary Care Provided by self    Provides Primary Care For child(flor)    Family Caregiver if Needed parent(s)    Family Caregiver Names Parents Mikel and Esther    Quality of Family Relationships supportive;helpful    Able to Return to Prior Arrangements yes       Resource/Environmental Concerns    Resource/Environmental Concerns none    Transportation Concerns none;other (see comments)  Pt concerned about potential driving restrictions, though she reports parents can provide transportation if needed       Transportation Needs    In the past 12 months, has lack of transportation kept you from medical appointments or from getting medications? no    In the past 12 months, has lack of transportation kept you from meetings, work, or from getting things needed for daily living? No       Food Insecurity    Within the past 12 months, you worried that your food would run out before you got the money to buy more. Never true    Within the past 12 months, the food you bought just didn't last and you didn't have money to get more. Never true       Transition Planning    Patient/Family Anticipates Transition to home with family    Patient/Family Anticipated Services at Transition none    Transportation Anticipated family or friend will provide       Discharge Needs Assessment     Equipment Currently Used at Home none    Concerns to be Addressed denies needs/concerns at this time    Do you want help finding or keeping work or a job? I do not need or want help    Do you want help with school or training? For example, starting or completing job training or getting a high school diploma, GED or equivalent No    Anticipated Changes Related to Illness other (see comments)  Potential driving restrictions    Equipment Needed After Discharge none                   Discharge Plan       Row Name 10/24/24 0454       Plan    Plan Home with family. Notify new PCP if patient not dc'd by 10/29/24    Patient/Family in Agreement with Plan yes    Plan Comments  spoke with patient at bedside. She reports she lives with her parents and minor children and is independent with all ADLs. She denies use of DME. She requests enrollment in Meds to bed, which was updated in EPIC. She reports her pcp left the practice & she needs new PCP. She gave  permission to attempt to arrange an appointment. I spoke with April at [facility] & obtained an appointment on 10/30/24 @ 2pm with Dr. Delmer Dyer. Pt informed and in agreement with this. She denies additional dc needs at this time. DC Barriers: Neurology and Cardiology consults; EEG                  Continued Care and Services - Admitted Since 10/24/2024    No active coordination exists for this encounter.          Demographic Summary       Row Name 10/24/24 9108       General Information    Admission Type observation    Arrived From home    Referral Source admission list    Reason for Consult discharge planning    Preferred Language English       Contact Information    Permission Granted to Share Info With ;family/designee;other (see comments)  Call center to obtain new PCP                   Functional Status       Row Name 10/24/24 2575       Functional Status    Usual Activity Tolerance good    Current Activity  Tolerance good       Functional Status, IADL    Medications independent    Meal Preparation independent    Housekeeping independent    Laundry independent    Shopping independent    If for any reason you need help with day-to-day activities such as bathing, preparing meals, shopping, managing finances, etc., do you get the help you need? I don't need any help                 Yanni Carballo RN, [facility]  Office: [phone]  Fax: [phone]  [email or web address]      I met with patient in room wearing PPE: mask    Maintained distance greater than six feet and spent </=15 minutes in the room    Yanni Carballo RN

## 2024-10-25 ENCOUNTER — APPOINTMENT (OUTPATIENT)
Dept: RESPIRATORY THERAPY | Facility: HOSPITAL | Age: 34
End: 2024-10-25
Payer: MEDICAID

## 2024-10-25 VITALS
BODY MASS INDEX: 33.65 KG/M2 | HEART RATE: 68 BPM | DIASTOLIC BLOOD PRESSURE: 68 MMHG | OXYGEN SATURATION: 100 % | RESPIRATION RATE: 18 BRPM | WEIGHT: 197.09 LBS | SYSTOLIC BLOOD PRESSURE: 100 MMHG | TEMPERATURE: 98 F | HEIGHT: 64 IN

## 2024-10-25 PROCEDURE — 99214 OFFICE O/P EST MOD 30 MIN: CPT | Performed by: INTERNAL MEDICINE

## 2024-10-25 PROCEDURE — G0378 HOSPITAL OBSERVATION PER HR: HCPCS

## 2024-10-25 RX ORDER — LEVETIRACETAM 500 MG/1
500 TABLET ORAL 2 TIMES DAILY
Qty: 60 TABLET | Refills: 0 | Status: SHIPPED | OUTPATIENT
Start: 2024-10-25 | End: 2024-11-24

## 2024-10-25 RX ADMIN — Medication 10 ML: at 09:55

## 2024-10-25 RX ADMIN — LEVETIRACETAM 500 MG: 500 TABLET, FILM COATED ORAL at 09:55

## 2024-10-25 NOTE — PLAN OF CARE
Problem: Adult Inpatient Plan of Care  Goal: Plan of Care Review  Outcome: Progressing  Goal: Patient-Specific Goal (Individualized)  Outcome: Progressing  Goal: Absence of Hospital-Acquired Illness or Injury  Outcome: Progressing  Intervention: Identify and Manage Fall Risk  Recent Flowsheet Documentation  Taken 10/25/2024 0200 by Cherry Apodaca LPN  Safety Promotion/Fall Prevention:   assistive device/personal items within reach   clutter free environment maintained   safety round/check completed   room organization consistent   nonskid shoes/slippers when out of bed  Taken 10/25/2024 0006 by Cherry Apodaca LPN  Safety Promotion/Fall Prevention:   assistive device/personal items within reach   clutter free environment maintained   nonskid shoes/slippers when out of bed   room organization consistent   patient off unit  Taken 10/24/2024 2200 by Cherry Apodaca LPN  Safety Promotion/Fall Prevention:   safety round/check completed   room organization consistent   assistive device/personal items within reach   clutter free environment maintained   nonskid shoes/slippers when out of bed  Taken 10/24/2024 1900 by Cherry Apodaca LPN  Safety Promotion/Fall Prevention:   activity supervised   assistive device/personal items within reach   clutter free environment maintained   nonskid shoes/slippers when out of bed   room organization consistent   safety round/check completed  Intervention: Prevent Skin Injury  Recent Flowsheet Documentation  Taken 10/25/2024 0006 by Cherry Apodaca LPN  Body Position: position changed independently  Taken 10/24/2024 1900 by Cherry Apodaca LPN  Body Position: position changed independently  Intervention: Prevent and Manage VTE (Venous Thromboembolism) Risk  Recent Flowsheet Documentation  Taken 10/25/2024 0006 by Cherry Apodaca LPN  VTE Prevention/Management:   bilateral   SCDs (sequential compression devices) off   patient refused intervention  Intervention: Prevent Infection  Recent Flowsheet  Documentation  Taken 10/25/2024 0200 by Cherry Apodaca LPN  Infection Prevention:   environmental surveillance performed   equipment surfaces disinfected   hand hygiene promoted   personal protective equipment utilized   rest/sleep promoted   single patient room provided  Taken 10/25/2024 0006 by Cherry Apodaca LPN  Infection Prevention:   environmental surveillance performed   equipment surfaces disinfected   hand hygiene promoted   personal protective equipment utilized   rest/sleep promoted   single patient room provided  Taken 10/24/2024 2200 by Cherry Apodaca LPN  Infection Prevention:   environmental surveillance performed   equipment surfaces disinfected   hand hygiene promoted   rest/sleep promoted   personal protective equipment utilized   single patient room provided  Taken 10/24/2024 1900 by Cherry Apodaca LPN  Infection Prevention:   single patient room provided   rest/sleep promoted   personal protective equipment utilized   hand hygiene promoted   equipment surfaces disinfected   environmental surveillance performed  Goal: Optimal Comfort and Wellbeing  Outcome: Progressing  Intervention: Provide Person-Centered Care  Recent Flowsheet Documentation  Taken 10/24/2024 1900 by Cherry Apodaca LPN  Trust Relationship/Rapport:   care explained   thoughts/feelings acknowledged   empathic listening provided  Goal: Readiness for Transition of Care  Outcome: Progressing     Problem: Comorbidity Management  Goal: Maintenance of Seizure Control  Outcome: Progressing  Intervention: Maintain Seizure Symptom Control  Recent Flowsheet Documentation  Taken 10/25/2024 0200 by Cherry Apodaca LPN  Medication Review/Management: medications reviewed  Taken 10/25/2024 0006 by Cherry Apodaca LPN  Medication Review/Management: medications reviewed  Taken 10/24/2024 2200 by Cherry Apodaca LPN  Medication Review/Management: medications reviewed  Seizure Precautions: side rails padded  Taken 10/24/2024 1900 by Cherry Apodaca LPN  Sensory  Stimulation Regulation: television on  Medication Review/Management: medications reviewed  Seizure Precautions:   side rails padded   emergency equipment at bedside     Problem: Seizure, Active Management  Goal: Absence of Seizure/Seizure-Related Injury  Outcome: Progressing  Intervention: Prevent Seizure-Related Injury  Recent Flowsheet Documentation  Taken 10/24/2024 2200 by Cherry Apodaca LPN  Seizure Precautions: side rails padded  Taken 10/24/2024 1900 by Cherry Apodaca LPN  Sensory Stimulation Regulation: television on  Seizure Precautions:   side rails padded   emergency equipment at bedside   Goal Outcome Evaluation:

## 2024-10-25 NOTE — PLAN OF CARE
Goal Outcome Evaluation:  Plan of Care Reviewed With: patient        Progress: improving     Patient discharging home today, will go with heart monitor for 4 weeks and follow up with cardiology, no seizures noted overnight, family at bedside, questions and concerns noted.      Problem: Adult Inpatient Plan of Care  Goal: Plan of Care Review  Outcome: Progressing  Flowsheets (Taken 10/25/2024 1246)  Progress: improving  Plan of Care Reviewed With: patient  Goal: Patient-Specific Goal (Individualized)  Outcome: Progressing  Goal: Absence of Hospital-Acquired Illness or Injury  Outcome: Progressing  Intervention: Identify and Manage Fall Risk  Recent Flowsheet Documentation  Taken 10/25/2024 1200 by Alondra Nicole RN  Safety Promotion/Fall Prevention: safety round/check completed  Taken 10/25/2024 1000 by Alondra Nicole RN  Safety Promotion/Fall Prevention: safety round/check completed  Taken 10/25/2024 0800 by Alondra Nicole RN  Safety Promotion/Fall Prevention: safety round/check completed  Goal: Optimal Comfort and Wellbeing  Outcome: Progressing  Intervention: Provide Person-Centered Care  Recent Flowsheet Documentation  Taken 10/25/2024 0800 by Alondra Nicole RN  Trust Relationship/Rapport:   care explained   questions answered   questions encouraged   reassurance provided  Goal: Readiness for Transition of Care  Outcome: Progressing     Problem: Comorbidity Management  Goal: Maintenance of Seizure Control  Outcome: Progressing     Problem: Seizure, Active Management  Goal: Absence of Seizure/Seizure-Related Injury  Outcome: Progressing

## 2024-10-25 NOTE — PROGRESS NOTES
Patient doing very well  Tolerating meds well    MRA did not show anything suggesting AVM or other abnormalities so probably old injury in the right deep white matter    Continue present antiepileptics    Follow-up with neurology after discharge    Seizure precautions    Discussed with family    Call if needed

## 2024-10-25 NOTE — PROGRESS NOTES
Cardiology [facility]      Patient Care Team:  Delmer Dyer MD as PCP - General (Family Medicine)      Cardiology assessment and plan     Possible syncope versus seizure  History of idiopathic intracranial hypertension  Pseudotumor cerebri  History of WPW/status post ablation therapy  Status post SVT ablation in 2015  Abnormal EEG  Possible seizure currently being treated by neurology     Denies any new cardiac symptoms  Alert and awake  Discussed with patient family at bedside  Tmax is 98 pulse is 60 respirations are 18 blood pressure is 100/68 sats are 100%  Diagnosis and treatment options reviewed and discussed with patient  Twelve-lead EKG shows normal sinus rhythm short WA interval and EKG pattern consistent with WPW syndrome  Recommend extended Holter monitor for 4 weeks at the time of discharge  Diagnosis and treatment options reviewed and discussed with patient  Unlikely patient had a syncopal episode  Diagnosis and treatment options reviewed and discussed with patient and family  Stable from cardiac standpoint for discharge  Follow-up in office in 4 weeks          Chief Complaint: Possible seizure    Subjective no new complaints    Interval History: No significant change in overall status    History taken from: patient    Review of Systems:  Review of Systems   Constitutional: Negative for chills, decreased appetite and malaise/fatigue.   HENT:  Negative for congestion and nosebleeds.    Eyes:  Negative for blurred vision and double vision.   Cardiovascular:  Negative for chest pain, dyspnea on exertion, irregular heartbeat, leg swelling, near-syncope, orthopnea and palpitations.   Respiratory:  Negative for cough and shortness of breath.    Hematologic/Lymphatic: Negative for adenopathy. Does not bruise/bleed easily.   Skin:  Negative for color change and rash.   Musculoskeletal:  Negative for back pain and joint pain.   Gastrointestinal:  Negative for bloating, abdominal pain, hematemesis and  "hematochezia.   Genitourinary:  Negative for flank pain and hematuria.   Neurological:  Negative for dizziness and focal weakness.   Psychiatric/Behavioral:  Negative for altered mental status. The patient does not have insomnia.        Objective    Vital Signs  Visit Vitals  /68 (BP Location: Right arm, Patient Position: Sitting)   Pulse 68   Temp 98 °F (36.7 °C) (Oral)   Resp 18   Ht 162.6 cm (64\")   Wt 89.4 kg (197 lb 1.5 oz)   LMP 10/01/2024   SpO2 100%   BMI 33.83 kg/m²     Oxygen Therapy  SpO2: 100 %  Pulse Oximetry Type: Intermittent  Device (Oxygen Therapy): room air  Flowsheet Rows      Flowsheet Row First Filed Value   Admission Height 160 cm (63\") Documented at 10/24/2024 0211   Admission Weight 89.4 kg (197 lb) Documented at 10/24/2024 0335          Intake & Output (last 3 days)         10/22 0701  10/23 0700 10/23 0701  10/24 0700 10/24 0701  10/25 0700 10/25 0701  10/26 0700    P.O.   1080 50    Other   100     Total Intake(mL/kg)   1180 (13.2) 50 (0.6)    Net   +1180 +50            Urine Unmeasured Occurrence   1 x           Lines, Drains & Airways       Active LDAs       Name Placement date Placement time Site Days    Peripheral IV 10/24/24 0235 Left Antecubital 10/24/24  0235  Antecubital  1                    Physical Exam:  Constitutional:       Appearance: Well-developed.   Eyes:      Conjunctiva/sclera: Conjunctivae normal.      Pupils: Pupils are equal, round, and reactive to light.   HENT:      Head: Normocephalic and atraumatic.   Neck:      Thyroid: No thyromegaly.   Pulmonary:      Effort: Pulmonary effort is normal.      Breath sounds: Normal breath sounds.   Cardiovascular:      Normal rate. Regular rhythm.   Pulses:     Intact distal pulses.   Edema:     Peripheral edema absent.   Abdominal:      General: Bowel sounds are normal.      Palpations: Abdomen is soft.   Musculoskeletal:      Cervical back: Normal range of motion and neck supple. Skin:     General: Skin is warm. "   Neurological:      Mental Status: Alert and oriented to person, place, and time.         Results Review:     I reviewed the patient's new clinical results.    Lab Results (last 24 hours)       Procedure Component Value Units Date/Time    TSH [[id number]]  (Normal) Collected: 10/24/24 0246    Specimen: Blood Updated: 10/24/24 1502     TSH 1.130 uIU/mL     Magnesium [[id number]]  (Normal) Collected: 10/24/24 0246    Specimen: Blood Updated: 10/24/24 1502     Magnesium 2.1 mg/dL     Phosphorus [[id number]]  (Normal) Collected: 10/24/24 0246    Specimen: Blood Updated: 10/24/24 1502     Phosphorus 2.5 mg/dL           Results for orders placed during the hospital encounter of 10/24/24    Adult Transthoracic Echo Complete w/ Color, Spectral and Contrast if Necessary Per Protocol    Interpretation Summary    Left ventricular systolic function is normal. Calculated left ventricular EF = 56% Left ventricular ejection fraction appears to be 56 - 60%.    Left ventricular diastolic function was normal.    Estimated right ventricular systolic pressure from tricuspid regurgitation is normal (<35 mmHg).        Medication Review:   I have reviewed the patient's current medication list  Scheduled Meds:enoxaparin, 40 mg, Subcutaneous, Q24H  levETIRAcetam, 500 mg, Oral, BID  sodium chloride, 10 mL, Intravenous, Q12H      Continuous Infusions:   PRN Meds:.  senna-docusate sodium **AND** polyethylene glycol **AND** bisacodyl **AND** bisacodyl    LORazepam    nitroglycerin    [COMPLETED] Insert Peripheral IV **AND** sodium chloride    sodium chloride    ECG/EMG Results (last 24 hours)       Procedure Component Value Units Date/Time    Telemetry Scan [[id number]] Resulted: 10/24/24     Updated: 10/24/24 2241    Telemetry Scan [[id number]] Resulted: 10/24/24     Updated: 10/25/24 0126    Telemetry Scan [[id number]] Resulted: 10/24/24     Updated: 10/25/24 0541            Imaging Results (Last 24 Hours)       Procedure Component Value Units  Date/Time    MRI Angiogram Head Without Contrast [[id number]] Collected: 10/24/24 2206     Updated: 10/24/24 2212    Narrative:      MRI ANGIOGRAM HEAD WO CONTRAST    Date of Exam: 10/24/2024 9:15 PM EDT    Indication: Concern for AVM.     Comparison: MRI brain 10/24/2024    Technique:  Routine 3-D time-of-flight gradient echo imaging was obtained of the head without contrast administration.      Findings:  Excellent opacification of the intracranial arteries. The bilateral ACAs, MCAs, ACAs, PCAs, vertebral arteries and basilar artery are patent without hemodynamically significant stenosis. No aneurysm is identified. In the visualized region, no there is no   evidence of arteriovenous malformation or dural fistula.    T2 weighted images once again demonstrates cystic lesions within the right posterior frontal lobe, consistent with perivascular spaces/Virchow Hoang spaces.      Impression:      Impression:  Normal MRA of the head.        Electronically Signed: Shun Ballard DO    10/24/2024 10:10 PM EDT    Workstation ID: [id number]          No results found for this or any previous visit.     Results for orders placed during the hospital encounter of 10/24/24    Adult Transthoracic Echo Complete w/ Color, Spectral and Contrast if Necessary Per Protocol    Interpretation Summary    Left ventricular systolic function is normal. Calculated left ventricular EF = 56% Left ventricular ejection fraction appears to be 56 - 60%.    Left ventricular diastolic function was normal.    Estimated right ventricular systolic pressure from tricuspid regurgitation is normal (<35 mmHg).     Lab Results   Component Value Date    GLUCOSE 105 (H) 10/24/2024    BUN 11 10/24/2024    CREATININE 0.66 10/24/2024    EGFR 119.7 10/24/2024    BCR 16.7 10/24/2024    K 3.9 10/24/2024    CO2 23.7 10/24/2024    CALCIUM 9.2 10/24/2024    ALBUMIN 4.7 09/04/2022    BILITOT 0.9 09/04/2022    AST 16 09/04/2022    ALT 19 09/04/2022      No results found  "for: \"CHOL\", \"CHLPL\", \"TRIG\", \"HDL\", \"LDL\", \"LDLDIRECT\"   No results found for: \"CKTOTAL\", \"CKMB\", \"CKMBINDEX\", \"TROPONINI\", \"TROPONINT\"     Assessment & Plan       Seizure        Haley Mckeon MD  10/25/24  12:06 EDT               "

## 2024-10-27 LAB
QT INTERVAL: 411 MS
QTC INTERVAL: 416 MS

## 2024-10-28 NOTE — CASE MANAGEMENT/SOCIAL WORK
Case Management Discharge Note      Final Note: home         Selected Continued Care - Discharged on 10/25/2024 Admission date: 10/24/2024 - Discharge disposition: Home or Self Care                 Transportation Services  Private: Car    Final Discharge Disposition Code: 01 - home or self-care

## 2024-11-25 ENCOUNTER — TELEPHONE (OUTPATIENT)
Dept: CARDIOLOGY | Facility: CLINIC | Age: 34
End: 2024-11-25
Payer: MEDICAID

## 2024-11-25 NOTE — TELEPHONE ENCOUNTER
Called and LM for the patient to call the office back. I have also sent a message to the patient's MyChart with her Holter results.     ----- Message from Haley Mckeon sent at 11/25/2024  8:09 AM EST -----  Holter monitor shows significant PVC burden otherwise no significant cardiac arrhythmia  Continue the same therapy and follow-up as scheduled  ----- Message -----  From: Haley Mckeon MD  Sent: 11/25/2024   8:06 AM EST  To: Haley Mckeon MD

## 2024-12-16 NOTE — PROGRESS NOTES
Subjective: Seizure, Intercranial hypertension     Patient ID: Charlotte Lynn is a 34 y.o. female.    CHIEF COMPLAINT: Seizure, Intercranial Hypertension     History of Present Illness Ms. Lynn is a 34-year-old  female who was referred by [facility] for follow-up after ED visit with seizure.  Devika Lynn, her mother is accompanying the patient today for this visit.    On 10/24/2024, her mother recounted the spell as: The patient went to bed and fell and sleep.  She was found later on the floor with her head resting on the bed.   She was stiff, not responding and had foamy mouth. The patient does not remember any of this. The episode had a duration of 3 minutes. She remained confused for around 10 minutes afterwards.  ED started Keppra 500 mg bid.    She is presenting today as a new patient to be followed for epilepsy and has been on Keppra 500 twice daily.    The patient has never had a seizure until this one seizure.  She states she is tolerating Keppra but it has caused some mood changes.  Her mother reports this was more pronounced when she first started and currently is doing well and is maybe a little more assertive.  The patient does not want to change to any other medication.        Date of Service: 10/25/24  Patient Name: Charlotte Lynn  : 1990  MRN: [id number]     Date of Admission: 10/24/2024  Discharge Diagnosis:   Seizures     Date of Discharge:  10/25/24  Primary Care Physician: Delmer Dyer MD   Presenting Problem:   Seizure [R56.9]   Active and Resolved Hospital Problems:  Active Hospital Problems    Diagnosis POA   **Seizure [R56.9]    Complaint: Seizure  Onset: 10/25/2024  Aura: Unknown occurred at night  Location: N/A  Quality: Arm and leg movement  Severity: Stiffening   Duration: 3 minutes  Frequency: 1  Timing: Night  Worsening factors: Unknown  Alleviating factors: Unknown  Associated Signs and symptoms: Fatigue  Current meds: None  Past  medications: Not applicable  Family History: No family history    Testing:  EEG:   Impression:   This is an abnormal adult awake, drowsy and asleep EEG  No abnormalities almost spike and wave type discharges, bilaterally but may be more common in the frontocentral regions on the right side.  Read by Dr. Ramirez     Clinical correlation is recommended  MRI Angiogram Head Without Contrast  Result Date: 10/24/2024  Impression: Impression: Normal MRA of the head. Electronically Signed: Shun Ballard DO  10/24/2024 10:10 PM EDT  Workstation ID: [id number]     MRI Brain Without Contrast   Result Date: 10/24/2024  Impression: Impression: 1. No acute intracranial abnormality. No clear etiology for seizures. 2. Redemonstration of signal intensity cystic areas in the right precentral gyrus subcortical white matter most consistent with prominent perivascular spaces. Electronically Signed: Dima Castellon MD  10/24/2024 5:54 AM EDT  Workstation ID: [id number]     CT venogram head   Result Date: 10/2/2024  Impression: Impression: 1. No evidence of venous sinus thrombosis. Electronically Signed: Zachary Granados MD  10/2/2024 3:28 PM EDT  Workstation ID: [id number]     Adult Transthoracic Echo Complete w/ Color, Spectral and Contrast if Necessary Per Protocol   Interpretation Summary    Left ventricular systolic function is normal. Calculated left ventricular EF = 56% Left ventricular ejection fraction appears to be 56 - 60%.    Left ventricular diastolic function was normal.    Estimated right ventricular systolic pressure from tricuspid regurgitation is normal (<35 mmHg).     ED history  History of present illness: Background per H&P:    Charlotte Lynn is a 34 y.o. female with a pseudotumor cerebri, WPW S/P ablation, who presented to [facility] on 10/24/2024 after losing consciousness and having generalized stiffness at home. History predominantly obtained from both patient's parents at bedside. Patient apparently was  on her usual state of health, went to bed and fell and sleep then was found on the floor with the head on the bed. Per parents, patient was stiff, not responding and had foamy mouth. Patient does not remember any of this. The episodes lasted around 3 minutes and she remained confused for around 10 minutes afterwards. No report of urinary/stool incontinence today however she did urinated on the bed 3 days ago which is unusual. Patient describes heaviness bilateral LE and dizziness. Denies headache or visual disturbance, vomiting, fever, chills. She states has not been taking her metoprolol over the past few days as she only had a few pills left. In ED, she is afebrile, normotensive, saturating well on room air. No seizure has occurred during ED stay. CBC and chemistry labs grossly normal. The decision to admit was made.    Hx as above.Pt did have a witnessed seizure by family, no known hx of seizures in the past. Patient did have a postictal state and continued to feel weak throughout the day. She is slowly starting to feel back to herself. She does have a headache but says that is normal for her. She is wanting to go home tonight to see her children.    Patient has been worked up outpatient for pseudotumor cerebri. 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 She is following with a neurosurgeon in [place].   ASSESSMENT/PLAN:   Single seizure with abnormal EEG  - MRI brain reviewed-there is 2 areas of likely cystic changes in the right precentral gyrus subcortical white matter.    -- Check MRA head to rule out underlying AVM  -EEG: Was abnormal showing spikes/increased discharges more common in the frontocentral regions on the right side.  - EKG: Sinus rhythm, rate 61  - Labs: TSH: P, Mag: P, Phos: P, Na: 139 , Ca: 9.2   - Follow up with Neurology outpatient for management  - Seizure precuations (see below)   - Maintain healthy lifestyle including stress management, routine  sleep schedule, and a well balanced diet.  Alcohol and drugs lowers the seizure threshold and should be avoided. Take all medications as prescribed.   Plan: Patient had a single event but her EEG was abnormal therefore recommending to start Keppra 500 mg twice daily. Side effects and directions discussed with patient and family. Will check MRA head to r/o AVM. No driving/state laws discussed with patient. Will recommend follow up with outpatient Neurology in 1-2 months.    There are no further recommendations. Will sign off, please call with any questions or concerns.  I discussed the patient's findings and my recommendations with patient, family, and nursing staff   Zulma Domingo, APRN  10/24/24            The following portions of the patient's history were reviewed and updated as appropriate: allergies, current medications, past family history, past medical history, past social history, past surgical history and problem list.      History reviewed. No pertinent family history.    Past Medical History:   Diagnosis Date    IIH (idiopathic intracranial hypertension) 2012       Social History     Socioeconomic History    Marital status: Single   Tobacco Use    Smoking status: Never    Smokeless tobacco: Never   Vaping Use    Vaping status: Every Day    Substances: Nicotine    Passive vaping exposure: Yes   Substance and Sexual Activity    Alcohol use: Yes     Comment: rare    Drug use: Never    Sexual activity: Defer         Current Outpatient Medications:     levETIRAcetam (KEPPRA) 500 MG tablet, Take 1 tablet by mouth 2 (Two) Times a Day for 360 days., Disp: 180 tablet, Rfl: 3    metoprolol succinate XL (TOPROL-XL) 50 MG 24 hr tablet, Take 1 tablet by mouth Daily., Disp: , Rfl:     Review of Systems   All other systems reviewed and are negative.       I have reviewed ROS completed by medical assistant.     Objective:    Neurological Exam  Mental Status  Awake and alert. Oriented only to person, place, time  and situation. Speech is normal. Language is fluent with no aphasia. Attention and concentration are normal. Fund of knowledge is appropriate for level of education.    Cranial Nerves  CN II: Right visual acuity: Normal. Left visual acuity: Normal. Right normal visual field. Left normal visual field.  CN III, IV, VI: Extraocular movements intact bilaterally. No nystagmus. Normal saccades. Normal smooth pursuit.   Right pupil: 2 mm. Reactive to light. Reactive to accommodation.   Left pupil: 2 mm. Reactive to light. Reactive to accommodation.  CN V:  Right: Facial sensation is normal. Jaw strength is normal.  Left: Facial sensation is normal on the left. Jaw strength is normal.  CN VII: Full and symmetric facial movement.  CN IX, X: Palate elevates symmetrically  CN XI: Shoulder shrug strength is normal.  CN XII: Tongue midline without atrophy or fasciculations.    Motor                                               Right                     Left  Deltoid                                   5                          5   Biceps                                   5                          5  Brachioradialis                      5                          5   Triceps                                  5                          5   Pronator                                5                          5   Supinator                              5                           5   Wrist flexor                            5                          5   Wrist extensor                       5                          5   Finger flexor                          5                          5   Finger extensor                     5                          5   Interossei                              5                          5   Quadriceps                           5                          5   Hamstring                             5                          5   Gastrocnemius                     5                           5   Anterior  tibialis                      5                          5   Posterior tibialis                    5                          5   Peroneal                               5                          5  Ankle dorsiflexor                   5                          5  Ankle plantar flexor              5                           5    Sensory  Light touch is normal in upper and lower extremities. Pinprick is normal in upper and lower extremities. Temperature is normal in upper and lower extremities. Vibration is normal in upper and lower extremities.     Reflexes                                            Right                      Left  Brachioradialis                    2+                         2+  Biceps                                 2+                         2+  Triceps                                2+                         2+  Finger flex                           2+                         2+  Hamstring                            2+                         2+  Patellar                                2+                         2+  Achilles                                2+                         2+  Right Plantar: downgoing  Left Plantar: downgoing    Coordination  Right: Finger-to-nose normal. Rapid alternating movement normal. Heel-to-shin normal.Left: Finger-to-nose normal. Rapid alternating movement normal. Heel-to-shin normal.    Gait  Casual gait is normal including stance, stride, and arm swing.Normal toe walking. Normal heel walking. Normal tandem gait. Romberg is absent.     The patient was told to observe all seizure precautions, including, but not limited to:   If a Seizures occurs: No driving until seizure free for more than 3 months  Avoid all high-risk activity, examples: Take showers instead of baths, avoid swimming without observation, Avoid open heat sources, Avoid working at heights, and Avoid engaging in all potentially hazardous activities , Avoid use of fire arms/hunting,    Patient  expressed clear understanding.     Extensive clinic time counseling patient and family on the following topics: common seizure triggers (sleep deprivation, medication non-compliance, stress, fever, and drugs/alcohol), personal risk for recurrence, epilepsy specific safety issues, and seizure first aid. If events last longer than 5 minutes, if the patient has multiple events without return to baseline, or if the patient has a serious injury from seizure, I advised them to call 911 immediately.    Assessment/Plan:  For seizures.  The patient will be sent for a 4-hour sleep deprived EEG.  She will be continued on Keppra 500 mg twice daily.  I did discuss seizure precautions extensively with her mother and the patient.    For prior history of pseudotumor: The patient will be sent to ophthalmology and was told she will need an ophthalmology follow-up yearly.  The patient may need another lumbar puncture.    Diagnoses and all orders for this visit:    1. Other generalized epilepsy, not intractable, without status epilepticus (Primary)  -     EEG Continuous Monitoring With Video; Future  -     levETIRAcetam (KEPPRA) 500 MG tablet; Take 1 tablet by mouth 2 (Two) Times a Day for 360 days.  Dispense: 180 tablet; Refill: 3    2. Pseudotumor cerebri  -     Ambulatory Referral to Ophthalmology      Return in about 6 months (around 6/17/2025) for Meg Oakes .    I spent 40 minutes caring for Charlotte on this date of service. This time includes time spent by me in the following activities: preparing for the visit, reviewing tests, obtaining and/or reviewing a separately obtained history, performing a medically appropriate examination and/or evaluation, counseling and educating the patient/family/caregiver, ordering medications, tests, or procedures, documenting information in the medical record, and independently interpreting results and communicating that information with the patient/family/caregiver.      This document  has been electronically signed by Meg VERAS on December 17, 2024 09:31 EST

## 2024-12-17 ENCOUNTER — OFFICE VISIT (OUTPATIENT)
Dept: NEUROLOGY | Facility: CLINIC | Age: 34
End: 2024-12-17
Payer: MEDICAID

## 2024-12-17 VITALS
DIASTOLIC BLOOD PRESSURE: 70 MMHG | BODY MASS INDEX: 33.46 KG/M2 | HEIGHT: 64 IN | SYSTOLIC BLOOD PRESSURE: 106 MMHG | HEART RATE: 61 BPM | WEIGHT: 196 LBS

## 2024-12-17 DIAGNOSIS — G93.2 PSEUDOTUMOR CEREBRI: ICD-10-CM

## 2024-12-17 DIAGNOSIS — G40.409 OTHER GENERALIZED EPILEPSY, NOT INTRACTABLE, WITHOUT STATUS EPILEPTICUS: Primary | ICD-10-CM

## 2024-12-17 RX ORDER — LEVETIRACETAM 500 MG/1
500 TABLET ORAL 2 TIMES DAILY
Qty: 180 TABLET | Refills: 3 | Status: SHIPPED | OUTPATIENT
Start: 2024-12-17 | End: 2025-12-12

## 2025-02-03 ENCOUNTER — HOSPITAL ENCOUNTER (OUTPATIENT)
Dept: NEUROLOGY | Facility: HOSPITAL | Age: 35
Discharge: HOME | End: 2025-02-03
Payer: MEDICAID

## 2025-02-03 DIAGNOSIS — G40.409 OTHER GENERALIZED EPILEPSY, NOT INTRACTABLE, WITHOUT STATUS EPILEPTICUS: ICD-10-CM

## 2025-06-03 ENCOUNTER — TELEPHONE (OUTPATIENT)
Dept: NEUROLOGY | Facility: CLINIC | Age: 35
End: 2025-06-03
Payer: MEDICAID

## 2025-06-03 DIAGNOSIS — R56.9 SEIZURE: Primary | ICD-10-CM

## 2025-06-03 NOTE — TELEPHONE ENCOUNTER
May need to do seizure monitoring to determine what type of seizure she is having first.    No seizure on last EEG.  I will order Seizure monitoring at [facility]. Need to know what type of seizure to give correct medication.

## 2025-06-03 NOTE — TELEPHONE ENCOUNTER
Provider: TRAVIS ADAMS    Caller: Charlotte Lynn    Relationship to Patient: Self    Pharmacy: [facility]    Phone Number: [phone]    Reason for Call: WOULD LIKE TO SEE IF SHE CAN GET A MEDICATION CHANGE BEFORE VISIT. STATES THE KEPPRA HAS REALLY AFFECTED HER MOOD & SHE WOULD LIKE TO TRY SOMETHING ELSE. PLEASE ADVISE, THANK YOU.

## 2025-06-23 NOTE — PROGRESS NOTES
Subjective: Seizures and history of  ? idiopathic intracranial hypertension    Patient ID: Charlotte Lynn is a 35 y.o. female.    History of Present Illness Ms. Lynn is a 35-year-old  female who has a history of seizure-like spells and idiopathic intracranial hypertension.     Idiopathic intracranial hypertension:  The patient has not seen ophthalmology.  She has had 1 lumbar puncture see results below.  She has had trials on Diamox and was unable to tolerate, discontinued per patient.  She also had trials on Topamax and discontinued that as well.  She was notified that idiopathic intracranial hypertension can cause blindness and that she should see an ophthalmologist a minimum of once per year.  I also discussed with the patient a referral to neurosurgery for shunt placement since she cannot tolerate medications.  She will consider this treatment after seeing ophthalmology.    Idiopathic intracranial hypertension  Idiopathic headache history  Onset: Years ago  Duration: Continue  Frequency: Daily headache      Seizures: The patient has a history of seizure disorder.  She has had 2 normal EEGs.  She is currently on Keppra 500 mg twice daily and states that is making her irritable at work.  She is afraid that she may lose her job due to the irritability.  She was notified that I can change her to a different seizure medication but cannot do a more specific one as we have had no seizures on any of the EEGs.  She is currently scheduled for a 4-hour sleep deprived EEG in July.    I agreed to change her to Vimpat since Keppra is negatively impacting her job.   Here is her current titration:    Week 1: Continue Keppra 500 twice daily start Vimpat 50 mg twice daily  Week 2: Vimpat 100 mg (2 tabs) twice a day                 Keppra 500 mg twice a day  Week 3: Vimpat 150 (3 tabs) twice a day                Keppra 500 at at bedtime  Week 4: Vimpat 200 mg (4 tabs) twice a day, stop Keppra      Seizure Type:  "Undetermined  Onset: Years ago  Frequency: Rare  Last Seizure: No seizures since last visit- she does have \"space out feeling\" once a day.   Semiology change:  Medication: Keppra 500 twice daily  Any adverse effects of meds-no   Failed medications- no  EEG: Normal 2/10/2025      Testing:     MRI BRAIN WO CONTRAST  Date of Exam: 10/24/2024 5:10 AM EDT  Impression:   1. No acute intracranial abnormality. No clear etiology for seizures.  2. Redemonstration of signal intensity cystic areas in the right precentral gyrus subcortical white matter most consistent with prominent perivascular spaces.  Electronically Signed: Dima Castellon MD    10/24/2024 5:54 AM EDT    Workstation ID: [id number]    MRA  MRI ANGIOGRAM HEAD WO CONTRAS   Date of Exam: 10/24/2024 9:15 PM EDT   Indication: Concern for AVM.  Comparison: MRI brain 10/24/2024   Technique:  Routine 3-D time-of-flight gradient echo imaging was obtained of the head without contrast administration.  Findings:  Excellent opacification of the intracranial arteries. The bilateral ACAs, MCAs, ACAs, PCAs, vertebral arteries and basilar artery are patent without hemodynamically significant stenosis. No aneurysm is identified. In the visualized region, no there is no evidence of arteriovenous malformation or dural fistula.   T2 weighted images once again demonstrates cystic lesions within the right posterior frontal lobe, consistent with perivascular spaces/Virchow Hoang spaces.  IMPRESSION:  Impression:  Normal MRA of the head.  Electronically Signed: Shun Ballard DO    10/24/2024 10:10 PM EDT    Workstation ID: [id number]    EEG  IMPRESSION:   This is a normal EEG recorded during the awake and drowsy state. The absence of epileptiform abnormalities however does not rule out the presence of a seizure disorder.   Electronically signed by:   Joseph Seipel, MD  February 10, 2025      DATE OF EXAM:  8/23/2024 1:03 PM EDT  PROCEDURE:  IR LUMBAR PUNCTURE " DIAGNOSTIC  INDICATIONS:  PSEUDOTUMOR CEREBRI  COMPARISON:  No Comparisons Available  FLUOROSCOPIC TIME:  40 seconds  PHYSICIAN MONITORED CONSCIOUS SEDATION TIME:  None minutes  TECHNIQUE:  A detailed explanation of the procedure, including the risks, benefits, and alternatives was provided. Informed consent was obtained from the patient. A preprocedure timeout was performed. The patient was placed left lateral decubitus position on the  fluoroscopy table and the lower back was marked and prepped and draped in the usual sterile fashion.  The skin was anesthetized with 1% lidocaine. Under fluoroscopic guidance a 22-gauge 3 1/2 inch Eduin needle was advanced into the thecal sac. Intrathecal position of the needle tip was confirmed by the efflux of clear colorless CSF. An opening CSF  pressure of 19 cm H2O. A total of 6 mL of clear CSF fluid was removed. A closing CSF pressure of 12 cm H2O was measured the needle was then removed. Hemostasis was achieved and a sterile bandage was applied. The patient tolerated the procedure well  without immediate complication. The patient was transported the post procedure area in stable condition.  FINDINGS:  See above  IMPRESSION:  1. Technically successful diagnostic lumbar puncture yielding 6 mL of clear colorless CSF.  2. An opening CSF pressure of 19 cm H2O was measured. A closing CSF pressure of 12 cm H2O was measured  Report dictated by: Elkin Lomas DNP  I have personally reviewed this case and agree with the findings above:  Electronically Signed: Aydin Wright MD  8/23/2024 3:20 PM EDT        The following portions of the patient's history were reviewed and updated as appropriate: allergies, current medications, past family history, past medical history, past social history, past surgical history and problem list.    History reviewed. No pertinent family history.    Past Medical History:   Diagnosis Date    IIH (idiopathic intracranial hypertension) 2012       Social  History     Socioeconomic History    Marital status: Single   Tobacco Use    Smoking status: Never    Smokeless tobacco: Never   Vaping Use    Vaping status: Every Day    Substances: Nicotine    Passive vaping exposure: Yes   Substance and Sexual Activity    Alcohol use: Yes     Comment: rare    Drug use: Never    Sexual activity: Defer         Current Outpatient Medications:     diclofenac (VOLTAREN) 50 MG EC tablet, Take 1 tablet by mouth., Disp: , Rfl:     HYDROcodone-acetaminophen (NORCO) 5-325 MG per tablet, Take 1 tablet by mouth every 6 (six) to 8 (eight) hours as needed for Pain., Disp: , Rfl:     levETIRAcetam (KEPPRA) 500 MG tablet, Take 1 tablet by mouth 2 (Two) Times a Day for 360 days., Disp: 180 tablet, Rfl: 3    metoprolol succinate XL (TOPROL-XL) 50 MG 24 hr tablet, Take 1 tablet by mouth Daily., Disp: , Rfl:     lacosamide (VIMPAT) 50 MG tablet tablet, Week 1: 1 tab bid, Week 2:  2 tabs bid, Week 3: 3 tabs bid, Week 4: 4 tabs, Disp: 240 tablet, Rfl: 5    Review of Systems   All other systems reviewed and are negative.         I have reviewed ROS completed by medical assistant.     Objective:      Neurological Exam  Mental Status  Awake and alert. Oriented only to person, place and time. Speech is normal. Language is fluent with no aphasia. Attention and concentration are normal. Fund of knowledge is appropriate for level of education.    Cranial Nerves  CN II: Right visual acuity: Normal. Left visual acuity: Normal. Right normal visual field. Left normal visual field.  CN III, IV, VI: Extraocular movements intact bilaterally. No nystagmus. Normal saccades. Normal smooth pursuit.   Right pupil: 2 mm. Reactive to light. Reactive to accommodation.   Left pupil: 2 mm. Reactive to light. Reactive to accommodation.  CN VII:  Right: There is no facial weakness.  Left: There is no facial weakness.    Motor   No fasciculations present.    Sensory  Light touch is normal in upper and lower extremities.      Gait  Casual gait is normal including stance, stride, and arm swing.         Assessment/Plan:  Discussion: The patient will be switched from Keppra to Vimpat following the schedule that is noted above in the chart.  If she has any breakthrough seizures questions or concerns she should contact the clinic.  She states she will complete the EEG that is ordered.    For intracranial hypertension the patient will be referred to ophthalmology.  The lumbar puncture only remove 6 cc of fluid.  Could be a very mild intracranial hypertension.  She states the headache is mild.  The main concern is the optic nerve and the patient was notified she should see ophthalmology on a yearly basis.  She agreed.  She was notified that if intracranial hypertension is not controlled it can cause blindness.    Diagnoses and all orders for this visit:    1. Seizure (Primary)  -     lacosamide (VIMPAT) 50 MG tablet tablet; Week 1: 1 tab bid, Week 2:  2 tabs bid, Week 3: 3 tabs bid, Week 4: 4 tabs  Dispense: 240 tablet; Refill: 5    2. Intracranial hypertension  -     Ambulatory Referral to Ophthalmology          Return in about 6 months (around 12/25/2025) for Meg Oakes .     I spent 49 minutes caring for Charlotte on this date of service. This time includes time spent by me in the following activities: preparing for the visit, reviewing tests, obtaining and/or reviewing a separately obtained history, performing a medically appropriate examination and/or evaluation, counseling and educating the patient/family/caregiver, ordering medications, tests, or procedures, referring and communicating with other health care professionals, documenting information in the medical record, and independently interpreting results and communicating that information with the patient/family/caregiver.      This document has been electronically signed by EDA Amaro on June 25, 2025 11:11 EDT

## 2025-06-25 ENCOUNTER — OFFICE VISIT (OUTPATIENT)
Dept: NEUROLOGY | Facility: CLINIC | Age: 35
End: 2025-06-25
Payer: MEDICAID

## 2025-06-25 VITALS
HEIGHT: 64 IN | HEART RATE: 64 BPM | DIASTOLIC BLOOD PRESSURE: 77 MMHG | SYSTOLIC BLOOD PRESSURE: 109 MMHG | BODY MASS INDEX: 32.44 KG/M2 | WEIGHT: 190 LBS

## 2025-06-25 DIAGNOSIS — R56.9 SEIZURE: Primary | ICD-10-CM

## 2025-06-25 DIAGNOSIS — G93.2 INTRACRANIAL HYPERTENSION: ICD-10-CM

## 2025-06-25 RX ORDER — LACOSAMIDE 50 MG/1
TABLET ORAL
Qty: 240 TABLET | Refills: 5 | Status: SHIPPED | OUTPATIENT
Start: 2025-06-25

## 2025-06-25 RX ORDER — HYDROCODONE BITARTRATE AND ACETAMINOPHEN 5; 325 MG/1; MG/1
1 TABLET ORAL
COMMUNITY
Start: 2025-04-14